# Patient Record
Sex: FEMALE | Race: WHITE | NOT HISPANIC OR LATINO | Employment: UNEMPLOYED | URBAN - METROPOLITAN AREA
[De-identification: names, ages, dates, MRNs, and addresses within clinical notes are randomized per-mention and may not be internally consistent; named-entity substitution may affect disease eponyms.]

---

## 2017-11-08 ENCOUNTER — TELEPHONE (OUTPATIENT)
Dept: GENETICS | Facility: HOSPITAL | Age: 22
End: 2017-11-08

## 2017-11-08 NOTE — TELEPHONE ENCOUNTER
Called to schedule Genetic appt. Patient wants to talk to spouse first and will call back to schedule.

## 2018-10-22 LAB
EXTERNAL ABO GROUPING: NORMAL
EXTERNAL ANTIBODY SCREEN: NEGATIVE
EXTERNAL CHLAMYDIA SCREEN: NEGATIVE
EXTERNAL GONORRHEA SCREEN: NEGATIVE
EXTERNAL HEPATITIS B SURFACE ANTIGEN: NEGATIVE
EXTERNAL HEPATITIS C AB: NEGATIVE
EXTERNAL RH FACTOR: POSITIVE
EXTERNAL RUBELLA QUALITATIVE: NORMAL
EXTERNAL SYPHILIS RPR SCREEN: NORMAL
HIV1 P24 AG SERPL QL IA: NORMAL

## 2019-03-18 LAB — EXTERNAL GTT 1 HOUR: 85

## 2019-05-13 ENCOUNTER — APPOINTMENT (OUTPATIENT)
Dept: LAB | Facility: HOSPITAL | Age: 24
End: 2019-05-13

## 2019-05-13 DIAGNOSIS — Z34.03 ENCOUNTER FOR SUPERVISION OF NORMAL FIRST PREGNANCY IN THIRD TRIMESTER: Primary | ICD-10-CM

## 2019-05-13 PROCEDURE — 87081 CULTURE SCREEN ONLY: CPT

## 2019-05-16 LAB — BACTERIA SPEC AEROBE CULT: NORMAL

## 2019-05-28 ENCOUNTER — ANESTHESIA EVENT (OUTPATIENT)
Dept: LABOR AND DELIVERY | Facility: HOSPITAL | Age: 24
End: 2019-05-28

## 2019-05-28 ENCOUNTER — HOSPITAL ENCOUNTER (INPATIENT)
Facility: HOSPITAL | Age: 24
LOS: 2 days | Discharge: HOME | End: 2019-05-30
Attending: OBSTETRICS & GYNECOLOGY | Admitting: OBSTETRICS & GYNECOLOGY

## 2019-05-28 ENCOUNTER — ANESTHESIA (OUTPATIENT)
Dept: LABOR AND DELIVERY | Facility: HOSPITAL | Age: 24
End: 2019-05-28

## 2019-05-28 DIAGNOSIS — R33.8 ACUTE URINARY RETENTION: Primary | ICD-10-CM

## 2019-05-28 PROBLEM — Z34.90 PREGNANCY: Status: ACTIVE | Noted: 2019-05-28

## 2019-05-28 PROBLEM — Z34.90 PREGNANCY: Status: RESOLVED | Noted: 2019-05-28 | Resolved: 2019-05-28

## 2019-05-28 LAB
ABO GROUP BLD: NORMAL
ALP SERPL-CCNC: 172 U/L (ref 39–117)
ALT SERPL W P-5'-P-CCNC: 8 U/L (ref 1–33)
AMPHET+METHAMPHET UR QL: NEGATIVE
AMPHETAMINES UR QL: NEGATIVE
AST SERPL-CCNC: 21 U/L (ref 1–32)
BARBITURATES UR QL SCN: NEGATIVE
BENZODIAZ UR QL SCN: NEGATIVE
BILIRUB SERPL-MCNC: 0.4 MG/DL (ref 0.2–1.2)
BLD GP AB SCN SERPL QL: NEGATIVE
BUPRENORPHINE SERPL-MCNC: NEGATIVE NG/ML
CANNABINOIDS SERPL QL: NEGATIVE
COCAINE UR QL: NEGATIVE
CREAT BLD-MCNC: 1.04 MG/DL (ref 0.57–1)
DEPRECATED RDW RBC AUTO: 39.6 FL (ref 37–54)
ERYTHROCYTE [DISTWIDTH] IN BLOOD BY AUTOMATED COUNT: 12.8 % (ref 12.3–15.4)
HCT VFR BLD AUTO: 36.5 % (ref 34–46.6)
HGB BLD-MCNC: 12 G/DL (ref 12–15.9)
LDH SERPL-CCNC: 229 U/L (ref 135–214)
MCH RBC QN AUTO: 28.4 PG (ref 26.6–33)
MCHC RBC AUTO-ENTMCNC: 32.9 G/DL (ref 31.5–35.7)
MCV RBC AUTO: 86.3 FL (ref 79–97)
METHADONE UR QL SCN: NEGATIVE
OPIATES UR QL: NEGATIVE
OXYCODONE UR QL SCN: NEGATIVE
PCP UR QL SCN: NEGATIVE
PLATELET # BLD AUTO: 192 10*3/MM3 (ref 140–450)
PMV BLD AUTO: 13.4 FL (ref 6–12)
PROPOXYPH UR QL: NEGATIVE
RBC # BLD AUTO: 4.23 10*6/MM3 (ref 3.77–5.28)
RH BLD: POSITIVE
T&S EXPIRATION DATE: NORMAL
TRICYCLICS UR QL SCN: NEGATIVE
URATE SERPL-MCNC: 5.3 MG/DL (ref 2.4–5.7)
WBC NRBC COR # BLD: 10.33 10*3/MM3 (ref 3.4–10.8)

## 2019-05-28 PROCEDURE — 10907ZC DRAINAGE OF AMNIOTIC FLUID, THERAPEUTIC FROM PRODUCTS OF CONCEPTION, VIA NATURAL OR ARTIFICIAL OPENING: ICD-10-PCS | Performed by: OBSTETRICS & GYNECOLOGY

## 2019-05-28 PROCEDURE — 25010000002 FENTANYL CITRATE (PF) 100 MCG/2ML SOLUTION: Performed by: NURSE ANESTHETIST, CERTIFIED REGISTERED

## 2019-05-28 PROCEDURE — 0UQMXZZ REPAIR VULVA, EXTERNAL APPROACH: ICD-10-PCS | Performed by: OBSTETRICS & GYNECOLOGY

## 2019-05-28 PROCEDURE — C1755 CATHETER, INTRASPINAL: HCPCS

## 2019-05-28 PROCEDURE — 86900 BLOOD TYPING SEROLOGIC ABO: CPT

## 2019-05-28 PROCEDURE — 82247 BILIRUBIN TOTAL: CPT | Performed by: OBSTETRICS & GYNECOLOGY

## 2019-05-28 PROCEDURE — 82565 ASSAY OF CREATININE: CPT | Performed by: OBSTETRICS & GYNECOLOGY

## 2019-05-28 PROCEDURE — 86850 RBC ANTIBODY SCREEN: CPT | Performed by: OBSTETRICS & GYNECOLOGY

## 2019-05-28 PROCEDURE — 25010000002 MAGNESIUM SULFATE-LACT RINGERS 40 GM/580ML SOLUTION: Performed by: OBSTETRICS & GYNECOLOGY

## 2019-05-28 PROCEDURE — 84460 ALANINE AMINO (ALT) (SGPT): CPT | Performed by: OBSTETRICS & GYNECOLOGY

## 2019-05-28 PROCEDURE — 85027 COMPLETE CBC AUTOMATED: CPT | Performed by: OBSTETRICS & GYNECOLOGY

## 2019-05-28 PROCEDURE — 84075 ASSAY ALKALINE PHOSPHATASE: CPT | Performed by: OBSTETRICS & GYNECOLOGY

## 2019-05-28 PROCEDURE — 3E033VJ INTRODUCTION OF OTHER HORMONE INTO PERIPHERAL VEIN, PERCUTANEOUS APPROACH: ICD-10-PCS | Performed by: OBSTETRICS & GYNECOLOGY

## 2019-05-28 PROCEDURE — 25010000002 ROPIVACAINE PER 1 MG: Performed by: NURSE ANESTHETIST, CERTIFIED REGISTERED

## 2019-05-28 PROCEDURE — 84450 TRANSFERASE (AST) (SGOT): CPT | Performed by: OBSTETRICS & GYNECOLOGY

## 2019-05-28 PROCEDURE — 86900 BLOOD TYPING SEROLOGIC ABO: CPT | Performed by: OBSTETRICS & GYNECOLOGY

## 2019-05-28 PROCEDURE — 0UQGXZZ REPAIR VAGINA, EXTERNAL APPROACH: ICD-10-PCS | Performed by: OBSTETRICS & GYNECOLOGY

## 2019-05-28 PROCEDURE — 0DQR0ZZ REPAIR ANAL SPHINCTER, OPEN APPROACH: ICD-10-PCS | Performed by: OBSTETRICS & GYNECOLOGY

## 2019-05-28 PROCEDURE — 84550 ASSAY OF BLOOD/URIC ACID: CPT | Performed by: OBSTETRICS & GYNECOLOGY

## 2019-05-28 PROCEDURE — C1755 CATHETER, INTRASPINAL: HCPCS | Performed by: ANESTHESIOLOGY

## 2019-05-28 PROCEDURE — 59025 FETAL NON-STRESS TEST: CPT

## 2019-05-28 PROCEDURE — 83615 LACTATE (LD) (LDH) ENZYME: CPT | Performed by: OBSTETRICS & GYNECOLOGY

## 2019-05-28 PROCEDURE — 86901 BLOOD TYPING SEROLOGIC RH(D): CPT | Performed by: OBSTETRICS & GYNECOLOGY

## 2019-05-28 PROCEDURE — 80306 DRUG TEST PRSMV INSTRMNT: CPT | Performed by: OBSTETRICS & GYNECOLOGY

## 2019-05-28 PROCEDURE — 86901 BLOOD TYPING SEROLOGIC RH(D): CPT

## 2019-05-28 RX ORDER — DOCUSATE SODIUM 100 MG/1
100 CAPSULE, LIQUID FILLED ORAL 2 TIMES DAILY
Status: DISCONTINUED | OUTPATIENT
Start: 2019-05-28 | End: 2019-05-30 | Stop reason: HOSPADM

## 2019-05-28 RX ORDER — DIPHENHYDRAMINE HYDROCHLORIDE 50 MG/ML
12.5 INJECTION INTRAMUSCULAR; INTRAVENOUS EVERY 8 HOURS PRN
Status: DISCONTINUED | OUTPATIENT
Start: 2019-05-28 | End: 2019-05-30

## 2019-05-28 RX ORDER — LIDOCAINE HYDROCHLORIDE AND EPINEPHRINE 15; 5 MG/ML; UG/ML
INJECTION, SOLUTION EPIDURAL AS NEEDED
Status: DISCONTINUED | OUTPATIENT
Start: 2019-05-28 | End: 2019-05-28 | Stop reason: SURG

## 2019-05-28 RX ORDER — BISACODYL 10 MG
10 SUPPOSITORY, RECTAL RECTAL DAILY PRN
Status: DISCONTINUED | OUTPATIENT
Start: 2019-05-29 | End: 2019-05-30 | Stop reason: HOSPADM

## 2019-05-28 RX ORDER — MISOPROSTOL 200 UG/1
800 TABLET ORAL AS NEEDED
Status: DISCONTINUED | OUTPATIENT
Start: 2019-05-28 | End: 2019-05-30

## 2019-05-28 RX ORDER — ONDANSETRON 4 MG/1
4 TABLET, FILM COATED ORAL EVERY 6 HOURS PRN
Status: DISCONTINUED | OUTPATIENT
Start: 2019-05-28 | End: 2019-05-30 | Stop reason: HOSPADM

## 2019-05-28 RX ORDER — PROMETHAZINE HYDROCHLORIDE 12.5 MG/1
12.5 TABLET ORAL EVERY 4 HOURS PRN
Status: DISCONTINUED | OUTPATIENT
Start: 2019-05-28 | End: 2019-05-30 | Stop reason: HOSPADM

## 2019-05-28 RX ORDER — TRISODIUM CITRATE DIHYDRATE AND CITRIC ACID MONOHYDRATE 500; 334 MG/5ML; MG/5ML
30 SOLUTION ORAL ONCE
Status: DISCONTINUED | OUTPATIENT
Start: 2019-05-28 | End: 2019-05-30

## 2019-05-28 RX ORDER — PANTOPRAZOLE SODIUM 20 MG/1
20 TABLET, DELAYED RELEASE ORAL DAILY
COMMUNITY
End: 2019-06-11

## 2019-05-28 RX ORDER — OXYTOCIN-SODIUM CHLORIDE 0.9% IV SOLN 30 UNIT/500ML 30-0.9/5 UT/ML-%
85 SOLUTION INTRAVENOUS ONCE
Status: COMPLETED | OUTPATIENT
Start: 2019-05-28 | End: 2019-05-28

## 2019-05-28 RX ORDER — ONDANSETRON 2 MG/ML
4 INJECTION INTRAMUSCULAR; INTRAVENOUS EVERY 6 HOURS PRN
Status: DISCONTINUED | OUTPATIENT
Start: 2019-05-28 | End: 2019-05-30 | Stop reason: HOSPADM

## 2019-05-28 RX ORDER — PRENATAL WITH FERROUS FUM AND FOLIC ACID 3080; 920; 120; 400; 22; 1.84; 3; 20; 10; 1; 12; 200; 27; 25; 2 [IU]/1; [IU]/1; MG/1; [IU]/1; MG/1; MG/1; MG/1; MG/1; MG/1; MG/1; UG/1; MG/1; MG/1; MG/1; MG/1
1 TABLET ORAL DAILY
COMMUNITY

## 2019-05-28 RX ORDER — OXYTOCIN-SODIUM CHLORIDE 0.9% IV SOLN 30 UNIT/500ML 30-0.9/5 UT/ML-%
2-24 SOLUTION INTRAVENOUS
Status: DISCONTINUED | OUTPATIENT
Start: 2019-05-28 | End: 2019-05-28

## 2019-05-28 RX ORDER — CARBOPROST TROMETHAMINE 250 UG/ML
250 INJECTION, SOLUTION INTRAMUSCULAR AS NEEDED
Status: DISCONTINUED | OUTPATIENT
Start: 2019-05-28 | End: 2019-05-30

## 2019-05-28 RX ORDER — EPHEDRINE SULFATE/0.9% NACL/PF 25 MG/5 ML
10 SYRINGE (ML) INTRAVENOUS
Status: DISCONTINUED | OUTPATIENT
Start: 2019-05-28 | End: 2019-05-30

## 2019-05-28 RX ORDER — IBUPROFEN 600 MG/1
600 TABLET ORAL EVERY 6 HOURS PRN
Status: DISCONTINUED | OUTPATIENT
Start: 2019-05-28 | End: 2019-05-30 | Stop reason: HOSPADM

## 2019-05-28 RX ORDER — LANOLIN 100 %
OINTMENT (GRAM) TOPICAL
Status: DISCONTINUED | OUTPATIENT
Start: 2019-05-28 | End: 2019-05-30 | Stop reason: HOSPADM

## 2019-05-28 RX ORDER — PRENATAL VIT/IRON FUM/FOLIC AC 27MG-0.8MG
1 TABLET ORAL DAILY
Status: DISCONTINUED | OUTPATIENT
Start: 2019-05-28 | End: 2019-05-30 | Stop reason: HOSPADM

## 2019-05-28 RX ORDER — ONDANSETRON 2 MG/ML
4 INJECTION INTRAMUSCULAR; INTRAVENOUS ONCE AS NEEDED
Status: DISCONTINUED | OUTPATIENT
Start: 2019-05-28 | End: 2019-05-30

## 2019-05-28 RX ORDER — MAGNESIUM CARB/ALUMINUM HYDROX 105-160MG
30 TABLET,CHEWABLE ORAL ONCE
Status: DISCONTINUED | OUTPATIENT
Start: 2019-05-28 | End: 2019-05-30

## 2019-05-28 RX ORDER — SODIUM CHLORIDE, SODIUM LACTATE, POTASSIUM CHLORIDE, CALCIUM CHLORIDE 600; 310; 30; 20 MG/100ML; MG/100ML; MG/100ML; MG/100ML
125 INJECTION, SOLUTION INTRAVENOUS CONTINUOUS
Status: DISCONTINUED | OUTPATIENT
Start: 2019-05-28 | End: 2019-05-30

## 2019-05-28 RX ORDER — MAGNESIUM SULF/RINGERS LACTATE 40 G/500ML
2 PLASTIC BAG, INJECTION (ML) INTRAVENOUS CONTINUOUS
Status: DISPENSED | OUTPATIENT
Start: 2019-05-28 | End: 2019-05-29

## 2019-05-28 RX ORDER — METOCLOPRAMIDE HYDROCHLORIDE 5 MG/ML
10 INJECTION INTRAMUSCULAR; INTRAVENOUS ONCE AS NEEDED
Status: DISCONTINUED | OUTPATIENT
Start: 2019-05-28 | End: 2019-05-30

## 2019-05-28 RX ORDER — HYDROCODONE BITARTRATE AND ACETAMINOPHEN 5; 325 MG/1; MG/1
1 TABLET ORAL EVERY 4 HOURS PRN
Status: DISCONTINUED | OUTPATIENT
Start: 2019-05-28 | End: 2019-05-30 | Stop reason: HOSPADM

## 2019-05-28 RX ORDER — OXYCODONE HYDROCHLORIDE AND ACETAMINOPHEN 5; 325 MG/1; MG/1
1 TABLET ORAL EVERY 4 HOURS PRN
Status: DISCONTINUED | OUTPATIENT
Start: 2019-05-28 | End: 2019-05-30 | Stop reason: HOSPADM

## 2019-05-28 RX ORDER — BUTORPHANOL TARTRATE 1 MG/ML
1 INJECTION, SOLUTION INTRAMUSCULAR; INTRAVENOUS
Status: DISCONTINUED | OUTPATIENT
Start: 2019-05-28 | End: 2019-05-30

## 2019-05-28 RX ORDER — FAMOTIDINE 10 MG/ML
20 INJECTION, SOLUTION INTRAVENOUS ONCE AS NEEDED
Status: DISCONTINUED | OUTPATIENT
Start: 2019-05-28 | End: 2019-05-30

## 2019-05-28 RX ORDER — FENTANYL CITRATE 50 UG/ML
INJECTION, SOLUTION INTRAMUSCULAR; INTRAVENOUS AS NEEDED
Status: DISCONTINUED | OUTPATIENT
Start: 2019-05-28 | End: 2019-05-28 | Stop reason: SURG

## 2019-05-28 RX ORDER — SODIUM CHLORIDE 0.9 % (FLUSH) 0.9 %
1-10 SYRINGE (ML) INJECTION AS NEEDED
Status: DISCONTINUED | OUTPATIENT
Start: 2019-05-28 | End: 2019-05-30 | Stop reason: HOSPADM

## 2019-05-28 RX ORDER — ROPIVACAINE HYDROCHLORIDE 2 MG/ML
12 INJECTION, SOLUTION EPIDURAL; INFILTRATION; PERINEURAL CONTINUOUS
Status: DISCONTINUED | OUTPATIENT
Start: 2019-05-28 | End: 2019-05-30

## 2019-05-28 RX ORDER — PROMETHAZINE HYDROCHLORIDE 25 MG/ML
12.5 INJECTION, SOLUTION INTRAMUSCULAR; INTRAVENOUS EVERY 4 HOURS PRN
Status: DISCONTINUED | OUTPATIENT
Start: 2019-05-28 | End: 2019-05-30 | Stop reason: HOSPADM

## 2019-05-28 RX ORDER — OXYTOCIN-SODIUM CHLORIDE 0.9% IV SOLN 30 UNIT/500ML 30-0.9/5 UT/ML-%
650 SOLUTION INTRAVENOUS ONCE
Status: COMPLETED | OUTPATIENT
Start: 2019-05-28 | End: 2019-05-28

## 2019-05-28 RX ORDER — METHYLERGONOVINE MALEATE 0.2 MG/ML
200 INJECTION INTRAVENOUS ONCE AS NEEDED
Status: DISCONTINUED | OUTPATIENT
Start: 2019-05-28 | End: 2019-05-30

## 2019-05-28 RX ADMIN — DOCUSATE SODIUM 100 MG: 100 CAPSULE, LIQUID FILLED ORAL at 20:35

## 2019-05-28 RX ADMIN — OXYCODONE HYDROCHLORIDE AND ACETAMINOPHEN 1 TABLET: 5; 325 TABLET ORAL at 19:05

## 2019-05-28 RX ADMIN — ROPIVACAINE HYDROCHLORIDE 10 ML: 5 INJECTION, SOLUTION EPIDURAL; INFILTRATION; PERINEURAL at 14:09

## 2019-05-28 RX ADMIN — Medication 2 G/HR: at 11:36

## 2019-05-28 RX ADMIN — PRENATAL VIT W/ FE FUMARATE-FA TAB 27-0.8 MG 1 TABLET: 27-0.8 TAB at 20:35

## 2019-05-28 RX ADMIN — SODIUM CHLORIDE, POTASSIUM CHLORIDE, SODIUM LACTATE AND CALCIUM CHLORIDE 125 ML/HR: 600; 310; 30; 20 INJECTION, SOLUTION INTRAVENOUS at 21:14

## 2019-05-28 RX ADMIN — SODIUM CHLORIDE, POTASSIUM CHLORIDE, SODIUM LACTATE AND CALCIUM CHLORIDE 125 ML/HR: 600; 310; 30; 20 INJECTION, SOLUTION INTRAVENOUS at 10:23

## 2019-05-28 RX ADMIN — FENTANYL CITRATE 100 MCG: 50 INJECTION, SOLUTION INTRAMUSCULAR; INTRAVENOUS at 14:08

## 2019-05-28 RX ADMIN — OXYTOCIN 2 MILLI-UNITS/MIN: 10 INJECTION, SOLUTION INTRAMUSCULAR; INTRAVENOUS at 10:30

## 2019-05-28 RX ADMIN — ROPIVACAINE HYDROCHLORIDE 12 ML/HR: 2 INJECTION, SOLUTION EPIDURAL; INFILTRATION at 14:12

## 2019-05-28 RX ADMIN — OXYTOCIN 650 ML/HR: 10 INJECTION, SOLUTION INTRAMUSCULAR; INTRAVENOUS at 15:02

## 2019-05-28 RX ADMIN — OXYTOCIN 85 ML/HR: 10 INJECTION, SOLUTION INTRAMUSCULAR; INTRAVENOUS at 15:20

## 2019-05-28 RX ADMIN — LIDOCAINE HYDROCHLORIDE AND EPINEPHRINE 3 ML: 15; 5 INJECTION, SOLUTION EPIDURAL at 14:05

## 2019-05-28 RX ADMIN — Medication: at 19:06

## 2019-05-28 RX ADMIN — SODIUM CHLORIDE, POTASSIUM CHLORIDE, SODIUM LACTATE AND CALCIUM CHLORIDE 1000 ML: 600; 310; 30; 20 INJECTION, SOLUTION INTRAVENOUS at 13:48

## 2019-05-28 NOTE — ANESTHESIA PREPROCEDURE EVALUATION
Anesthesia Evaluation     Patient summary reviewed and Nursing notes reviewed   NPO Solid Status: > 6 hours  NPO Liquid Status: > 6 hours           Airway   Mallampati: II  TM distance: >3 FB  Neck ROM: full  No difficulty expected  Dental      Pulmonary - negative pulmonary ROS   Cardiovascular - negative cardio ROS        Neuro/Psych- negative ROS  GI/Hepatic/Renal/Endo    (+)  GERD,      Musculoskeletal (-) negative ROS    Abdominal    Substance History - negative use     OB/GYN    (+) Pregnant,         Other                        Anesthesia Plan    ASA 2     epidural     Anesthetic plan, all risks, benefits, and alternatives have been provided, discussed and informed consent has been obtained with: patient.

## 2019-05-28 NOTE — ANESTHESIA PROCEDURE NOTES
Labor Epidural      Patient location during procedure: OB  Performed By  Anesthesiologist: Jarrett Cortez MD  CRNA: Tasia Fritz CRNA  Preanesthetic Checklist  Completed: patient identified, surgical consent, pre-op evaluation, timeout performed, IV checked, risks and benefits discussed and monitors and equipment checked  Prep:  Pt Position:sitting  Sterile Tech:cap, gloves, mask and sterile barrier  Prep:DuraPrep  Monitoring:blood pressure monitoring  Epidural Block Procedure:  Approach:midline  Guidance:palpation technique  Location:L3-L4  Needle Type:Tuohy  Needle Gauge:17 G  Loss of Resistance Medium: saline  Loss of Resistance: 5cm  Cath Depth at skin:11 cm  Paresthesia: none  Aspiration:negative  Test Dose:negative  Number of Attempts: 1  Post Assessment:  Dressing:occlusive dressing applied and secured with tape  Pt Tolerance:patient tolerated the procedure well with no apparent complications  Complications:no

## 2019-05-29 LAB
ALP SERPL-CCNC: 128 U/L (ref 39–117)
ALT SERPL W P-5'-P-CCNC: 10 U/L (ref 1–33)
AST SERPL-CCNC: 24 U/L (ref 1–32)
BASOPHILS # BLD AUTO: 0.01 10*3/MM3 (ref 0–0.2)
BASOPHILS NFR BLD AUTO: 0.1 % (ref 0–1.5)
BILIRUB SERPL-MCNC: 0.2 MG/DL (ref 0.2–1.2)
CREAT BLD-MCNC: 0.71 MG/DL (ref 0.57–1)
DEPRECATED RDW RBC AUTO: 39.2 FL (ref 37–54)
EOSINOPHIL # BLD AUTO: 0.01 10*3/MM3 (ref 0–0.4)
EOSINOPHIL NFR BLD AUTO: 0.1 % (ref 0.3–6.2)
ERYTHROCYTE [DISTWIDTH] IN BLOOD BY AUTOMATED COUNT: 12.8 % (ref 12.3–15.4)
HCT VFR BLD AUTO: 25.5 % (ref 34–46.6)
HGB BLD-MCNC: 8.6 G/DL (ref 12–15.9)
IMM GRANULOCYTES # BLD AUTO: 0.06 10*3/MM3 (ref 0–0.05)
IMM GRANULOCYTES NFR BLD AUTO: 0.5 % (ref 0–0.5)
LDH SERPL-CCNC: 341 U/L (ref 135–214)
LYMPHOCYTES # BLD AUTO: 1.53 10*3/MM3 (ref 0.7–3.1)
LYMPHOCYTES NFR BLD AUTO: 11.7 % (ref 19.6–45.3)
MCH RBC QN AUTO: 28.4 PG (ref 26.6–33)
MCHC RBC AUTO-ENTMCNC: 33.7 G/DL (ref 31.5–35.7)
MCV RBC AUTO: 84.2 FL (ref 79–97)
MONOCYTES # BLD AUTO: 0.63 10*3/MM3 (ref 0.1–0.9)
MONOCYTES NFR BLD AUTO: 4.8 % (ref 5–12)
NEUTROPHILS # BLD AUTO: 10.86 10*3/MM3 (ref 1.7–7)
NEUTROPHILS NFR BLD AUTO: 82.8 % (ref 42.7–76)
PLATELET # BLD AUTO: 156 10*3/MM3 (ref 140–450)
PMV BLD AUTO: 11.7 FL (ref 6–12)
RBC # BLD AUTO: 3.03 10*6/MM3 (ref 3.77–5.28)
URATE SERPL-MCNC: 4.5 MG/DL (ref 2.4–5.7)
WBC NRBC COR # BLD: 13.1 10*3/MM3 (ref 3.4–10.8)

## 2019-05-29 PROCEDURE — 84550 ASSAY OF BLOOD/URIC ACID: CPT | Performed by: OBSTETRICS & GYNECOLOGY

## 2019-05-29 PROCEDURE — 84460 ALANINE AMINO (ALT) (SGPT): CPT | Performed by: OBSTETRICS & GYNECOLOGY

## 2019-05-29 PROCEDURE — 84075 ASSAY ALKALINE PHOSPHATASE: CPT | Performed by: OBSTETRICS & GYNECOLOGY

## 2019-05-29 PROCEDURE — 83615 LACTATE (LD) (LDH) ENZYME: CPT | Performed by: OBSTETRICS & GYNECOLOGY

## 2019-05-29 PROCEDURE — 25010000002 MAGNESIUM SULFATE-LACT RINGERS 40 GM/580ML SOLUTION: Performed by: OBSTETRICS & GYNECOLOGY

## 2019-05-29 PROCEDURE — 82247 BILIRUBIN TOTAL: CPT | Performed by: OBSTETRICS & GYNECOLOGY

## 2019-05-29 PROCEDURE — 85025 COMPLETE CBC W/AUTO DIFF WBC: CPT | Performed by: OBSTETRICS & GYNECOLOGY

## 2019-05-29 PROCEDURE — 84450 TRANSFERASE (AST) (SGOT): CPT | Performed by: OBSTETRICS & GYNECOLOGY

## 2019-05-29 PROCEDURE — 82565 ASSAY OF CREATININE: CPT | Performed by: OBSTETRICS & GYNECOLOGY

## 2019-05-29 RX ORDER — NIFEDIPINE 30 MG/1
30 TABLET, EXTENDED RELEASE ORAL 2 TIMES DAILY
Status: DISCONTINUED | OUTPATIENT
Start: 2019-05-29 | End: 2019-05-30 | Stop reason: HOSPADM

## 2019-05-29 RX ADMIN — IBUPROFEN 600 MG: 600 TABLET ORAL at 05:45

## 2019-05-29 RX ADMIN — IBUPROFEN 600 MG: 600 TABLET ORAL at 23:51

## 2019-05-29 RX ADMIN — NIFEDIPINE 30 MG: 30 TABLET, FILM COATED, EXTENDED RELEASE ORAL at 15:39

## 2019-05-29 RX ADMIN — DOCUSATE SODIUM 100 MG: 100 CAPSULE, LIQUID FILLED ORAL at 21:30

## 2019-05-29 RX ADMIN — Medication 2 G/HR: at 02:36

## 2019-05-29 RX ADMIN — IBUPROFEN 600 MG: 600 TABLET ORAL at 14:53

## 2019-05-29 RX ADMIN — NIFEDIPINE 30 MG: 30 TABLET, FILM COATED, EXTENDED RELEASE ORAL at 21:30

## 2019-05-29 NOTE — ANESTHESIA POSTPROCEDURE EVALUATION
Patient: Nancy Little    Procedure Summary     Date:  05/28/19 Room / Location:      Anesthesia Start:  1359 Anesthesia Stop:  1502    Procedure:  LABOR ANALGESIA Diagnosis:      Scheduled Providers:   Provider:  Jarrett Cortez MD    Anesthesia Type:  epidural ASA Status:  2          Anesthesia Type: epidural  Last vitals  BP   138/99 (05/29/19 0627)   Temp   98.3 °F (36.8 °C) (05/29/19 0748)   Pulse   112 (05/29/19 0627)   Resp   16 (05/29/19 0748)     SpO2         Post Anesthesia Care and Evaluation    Patient location during evaluation: bedside  Patient participation: complete - patient participated  Level of consciousness: awake and awake and alert  Pain score: 0  Pain management: satisfactory to patient  Airway patency: patent  Anesthetic complications: No anesthetic complications  PONV Status: none  Cardiovascular status: acceptable  Respiratory status: acceptable  Hydration status: acceptable  Post Neuraxial Block status: Motor and sensory function returned to baseline and No signs or symptoms of PDPH

## 2019-05-30 VITALS
SYSTOLIC BLOOD PRESSURE: 120 MMHG | TEMPERATURE: 98.3 F | DIASTOLIC BLOOD PRESSURE: 79 MMHG | RESPIRATION RATE: 14 BRPM | BODY MASS INDEX: 24.73 KG/M2 | HEART RATE: 117 BPM | HEIGHT: 61 IN | WEIGHT: 131 LBS

## 2019-05-30 PROBLEM — R33.8 ACUTE URINARY RETENTION: Status: ACTIVE | Noted: 2019-05-30

## 2019-05-30 PROBLEM — O14.90 PRE-ECLAMPSIA: Status: ACTIVE | Noted: 2019-05-30

## 2019-05-30 LAB
ALBUMIN SERPL-MCNC: 2.9 G/DL (ref 3.5–5.2)
ALBUMIN/GLOB SERPL: 1.1 G/DL
ALP SERPL-CCNC: 126 U/L (ref 39–117)
ALP SERPL-CCNC: 126 U/L (ref 39–117)
ALT SERPL W P-5'-P-CCNC: 11 U/L (ref 1–33)
ALT SERPL W P-5'-P-CCNC: 11 U/L (ref 1–33)
ANION GAP SERPL CALCULATED.3IONS-SCNC: 7 MMOL/L
AST SERPL-CCNC: 22 U/L (ref 1–32)
AST SERPL-CCNC: 22 U/L (ref 1–32)
BILIRUB SERPL-MCNC: 0.3 MG/DL (ref 0.2–1.2)
BILIRUB SERPL-MCNC: 0.3 MG/DL (ref 0.2–1.2)
BUN BLD-MCNC: 9 MG/DL (ref 6–20)
BUN/CREAT SERPL: 12.3 (ref 7–25)
CALCIUM SPEC-SCNC: 6.9 MG/DL (ref 8.6–10.5)
CHLORIDE SERPL-SCNC: 109 MMOL/L (ref 98–107)
CO2 SERPL-SCNC: 25 MMOL/L (ref 22–29)
CREAT BLD-MCNC: 0.73 MG/DL (ref 0.57–1)
CREAT BLD-MCNC: 0.73 MG/DL (ref 0.57–1)
DEPRECATED RDW RBC AUTO: 42.8 FL (ref 37–54)
ERYTHROCYTE [DISTWIDTH] IN BLOOD BY AUTOMATED COUNT: 13.5 % (ref 12.3–15.4)
GFR SERPL CREATININE-BSD FRML MDRD: 97 ML/MIN/1.73
GLOBULIN UR ELPH-MCNC: 2.7 GM/DL
GLUCOSE BLD-MCNC: 77 MG/DL (ref 65–99)
HCT VFR BLD AUTO: 26.4 % (ref 34–46.6)
HGB BLD-MCNC: 8.5 G/DL (ref 12–15.9)
LDH SERPL-CCNC: 405 U/L (ref 135–214)
MCH RBC QN AUTO: 28.1 PG (ref 26.6–33)
MCHC RBC AUTO-ENTMCNC: 32.2 G/DL (ref 31.5–35.7)
MCV RBC AUTO: 87.4 FL (ref 79–97)
PLATELET # BLD AUTO: 180 10*3/MM3 (ref 140–450)
PMV BLD AUTO: 11.8 FL (ref 6–12)
POTASSIUM BLD-SCNC: 4.1 MMOL/L (ref 3.5–5.2)
PROT SERPL-MCNC: 5.6 G/DL (ref 6–8.5)
RBC # BLD AUTO: 3.02 10*6/MM3 (ref 3.77–5.28)
SODIUM BLD-SCNC: 141 MMOL/L (ref 136–145)
URATE SERPL-MCNC: 4.5 MG/DL (ref 2.4–5.7)
WBC NRBC COR # BLD: 12.93 10*3/MM3 (ref 3.4–10.8)

## 2019-05-30 PROCEDURE — 82565 ASSAY OF CREATININE: CPT | Performed by: OBSTETRICS & GYNECOLOGY

## 2019-05-30 PROCEDURE — 84450 TRANSFERASE (AST) (SGOT): CPT | Performed by: OBSTETRICS & GYNECOLOGY

## 2019-05-30 PROCEDURE — 84550 ASSAY OF BLOOD/URIC ACID: CPT | Performed by: OBSTETRICS & GYNECOLOGY

## 2019-05-30 PROCEDURE — 83615 LACTATE (LD) (LDH) ENZYME: CPT | Performed by: OBSTETRICS & GYNECOLOGY

## 2019-05-30 PROCEDURE — 80053 COMPREHEN METABOLIC PANEL: CPT | Performed by: OBSTETRICS & GYNECOLOGY

## 2019-05-30 PROCEDURE — 84460 ALANINE AMINO (ALT) (SGPT): CPT | Performed by: OBSTETRICS & GYNECOLOGY

## 2019-05-30 PROCEDURE — 82247 BILIRUBIN TOTAL: CPT | Performed by: OBSTETRICS & GYNECOLOGY

## 2019-05-30 PROCEDURE — 85027 COMPLETE CBC AUTOMATED: CPT | Performed by: OBSTETRICS & GYNECOLOGY

## 2019-05-30 PROCEDURE — 84075 ASSAY ALKALINE PHOSPHATASE: CPT | Performed by: OBSTETRICS & GYNECOLOGY

## 2019-05-30 RX ORDER — DOCUSATE SODIUM 100 MG/1
100 CAPSULE, LIQUID FILLED ORAL 2 TIMES DAILY
Status: DISCONTINUED | OUTPATIENT
Start: 2019-05-30 | End: 2019-05-30 | Stop reason: HOSPADM

## 2019-05-30 RX ORDER — NITROFURANTOIN 25; 75 MG/1; MG/1
100 CAPSULE ORAL NIGHTLY
Status: DISCONTINUED | OUTPATIENT
Start: 2019-05-30 | End: 2019-05-30 | Stop reason: HOSPADM

## 2019-05-30 RX ORDER — DIPHENHYDRAMINE HCL 25 MG
25 CAPSULE ORAL NIGHTLY PRN
Status: DISCONTINUED | OUTPATIENT
Start: 2019-05-30 | End: 2019-05-30 | Stop reason: HOSPADM

## 2019-05-30 RX ORDER — NITROFURANTOIN 25; 75 MG/1; MG/1
100 CAPSULE ORAL NIGHTLY
Qty: 7 CAPSULE | Refills: 0 | Status: SHIPPED | OUTPATIENT
Start: 2019-05-30 | End: 2019-05-30

## 2019-05-30 RX ORDER — NIFEDIPINE 30 MG/1
30 TABLET, FILM COATED, EXTENDED RELEASE ORAL 2 TIMES DAILY
Qty: 30 TABLET | Refills: 1 | Status: SHIPPED | OUTPATIENT
Start: 2019-05-30 | End: 2019-05-30

## 2019-05-30 RX ORDER — SODIUM CHLORIDE 0.9 % (FLUSH) 0.9 %
1-10 SYRINGE (ML) INJECTION AS NEEDED
Status: DISCONTINUED | OUTPATIENT
Start: 2019-05-30 | End: 2019-05-30 | Stop reason: HOSPADM

## 2019-05-30 RX ORDER — NIFEDIPINE 30 MG/1
30 TABLET, FILM COATED, EXTENDED RELEASE ORAL 2 TIMES DAILY
Qty: 30 TABLET | Refills: 1 | Status: SHIPPED | OUTPATIENT
Start: 2019-05-30 | End: 2019-06-11

## 2019-05-30 RX ORDER — NITROFURANTOIN 25; 75 MG/1; MG/1
100 CAPSULE ORAL 2 TIMES DAILY
Qty: 14 CAPSULE | Refills: 0 | Status: SHIPPED | OUTPATIENT
Start: 2019-05-30 | End: 2019-06-06

## 2019-05-30 RX ORDER — ACETAMINOPHEN 500 MG
1000 TABLET ORAL EVERY 4 HOURS PRN
Status: DISCONTINUED | OUTPATIENT
Start: 2019-05-30 | End: 2019-05-30 | Stop reason: HOSPADM

## 2019-05-30 RX ORDER — BISACODYL 10 MG
10 SUPPOSITORY, RECTAL RECTAL DAILY PRN
Status: DISCONTINUED | OUTPATIENT
Start: 2019-05-30 | End: 2019-05-30 | Stop reason: HOSPADM

## 2019-05-30 RX ADMIN — DOCUSATE SODIUM 100 MG: 100 CAPSULE, LIQUID FILLED ORAL at 09:15

## 2019-05-30 RX ADMIN — PRENATAL VIT W/ FE FUMARATE-FA TAB 27-0.8 MG 1 TABLET: 27-0.8 TAB at 09:16

## 2019-05-30 RX ADMIN — NIFEDIPINE 30 MG: 30 TABLET, FILM COATED, EXTENDED RELEASE ORAL at 09:16

## 2019-06-07 DIAGNOSIS — R00.2 PALPITATIONS: Primary | ICD-10-CM

## 2019-06-10 NOTE — PROGRESS NOTES
[facility] at [facility]  Consultation History and Physical  Nancy Little  1995    VISIT DATE:  06/11/19    PCP:   Provider, No Known  [facility]  [place] [id number]      CC:  Irregular Heart Beat; Dizziness; and Syncope      Problem List:  1.  Pre-eclampsia with most recent pregnancy.  She is on labetalol    History of Present Illness:  Nancy Little  Is a 24 y.o. female with pertinent cardiac history detailed above.  She was discharged from the hospital May 30 after having a vaginal delivery at 38 weeks and 5 days.  This was her first pregnancy.  She did undergo induction because of severe preeclampsia which developed on the day of her last OB appointment.  She was discharged initially on nifedipine but at her last postpartum visit Thursday last week her heart rates were elevated in the 130s to 140s.  Because of this nifedipine was stopped and she was started on labetalol 200 mg 3 times a day..  She is referred by Dr. Rader for evaluation of elevated heart rate .  However following the switch to labetalol the patient noted she was having symptomatic hypotension.  After taking her first dose she said she got severely dizzy and actually had a syncopal episode.  She has been taking only 100 mg once a day.  Her next OB appointment is Thursday of this week.  Prior to pregnancy she had no problems with palpitations tachycardia or syncope.  She denies any chest pain or shortness of breath.  Her baby is healthy and doing well      Patient Active Problem List    Diagnosis Date Noted   • Acute urinary retention 05/30/2019   • Pre-eclampsia 05/30/2019   • Vaginal delivery 05/28/2019       Allergies   Allergen Reactions   • Penicillins Rash       Social History     Socioeconomic History   • Marital status:      Spouse name: Not on file   • Number of children: Not on file   • Years of education: Not on file   • Highest education level: Not on file   Tobacco Use   • Smoking  "status: Never Smoker   • Smokeless tobacco: Never Used   Substance and Sexual Activity   • Alcohol use: No   • Drug use: No   • Sexual activity: Defer       Family History   Problem Relation Age of Onset   • No Known Problems Father    • Breast cancer Mother    • No Known Problems Sister    • Diabetes Maternal Grandfather        Current Medications:    Current Outpatient Medications:   •  bethanechol (URECHOLINE) 25 MG tablet, Take 25 mg by mouth 3 (Three) Times a Day., Disp: , Rfl: 6  •  ibuprofen (ADVIL,MOTRIN) 200 MG tablet, Take 200 mg by mouth every 6 (six) hours as needed for mild pain (1-3). Pt to stop before surgery, Disp: , Rfl:   •  Prenatal Vit-Fe Fumarate-FA (PRENATAL 27-1) 27-1 MG tablet tablet, Take  by mouth Daily., Disp: , Rfl:   •  metoprolol tartrate (LOPRESSOR) 25 MG tablet, Take 1 tablet by mouth 2 (Two) Times a Day., Disp: 60 tablet, Rfl: 3     Review of Systems   Cardiovascular: Positive for palpitations and syncope. Negative for chest pain, cyanosis, dyspnea on exertion and leg swelling.   All other systems reviewed and are negative.      Vitals:    06/11/19 0818   BP: 110/74   BP Location: Right arm   Patient Position: Sitting   Pulse: 89   SpO2: 98%   Weight: 50.8 kg (112 lb)   Height: 154.9 cm (61\")       Physical Exam   Constitutional: She is oriented to person, place, and time. She appears well-developed and well-nourished. No distress.   HENT:   Head: Normocephalic and atraumatic.   Eyes: EOM are normal.   Neck: Neck supple. No JVD present.   Cardiovascular: Normal rate, regular rhythm, normal heart sounds and intact distal pulses. Exam reveals no gallop and no friction rub.   No murmur heard.  Pulmonary/Chest: Effort normal and breath sounds normal. She has no rales.   Abdominal: Soft.   Musculoskeletal: Normal range of motion. She exhibits no edema.   Neurological: She is alert and oriented to person, place, and time.   Skin: Skin is warm and dry.       Diagnostic Data:    ECG 12 " Lead  Date/Time: 6/11/2019 8:34 AM  Performed by: Doni Conrad MD  Authorized by: Doni Conrad MD   Previous ECG: no previous ECG available  Rhythm: sinus rhythm  Rate: normal  BPM: 89  QRS axis: normal    Clinical impression: normal ECG          No results found for: CHLPL, TRIG, HDL, LDLDIRECT  Lab Results   Component Value Date    GLUCOSE 77 05/30/2019    BUN 9 05/30/2019    CREATININE 0.73 05/30/2019    CREATININE 0.73 05/30/2019     05/30/2019    K 4.1 05/30/2019     (H) 05/30/2019    CO2 25.0 05/30/2019     No results found for: HGBA1C  Lab Results   Component Value Date    WBC 12.93 (H) 05/30/2019    HGB 8.5 (L) 05/30/2019    HCT 26.4 (L) 05/30/2019     05/30/2019       Assessment:  Pre-Eclampsia  Rapid heart rates and palpitations    Plan:    -Patient's blood pressure is now well controlled and she is actually having symptomatic episodes of hypotension on the full dose of labetalol  -We will discontinue labetalol and start metoprolol 25 mg twice daily  -Palpitations and rapid heart rates may in part be driven by preeclampsia, delivery, as well as nifedipine side effect  -Holter monitor results reviewed and show  -We will follow-up lab work from OB office including thyroid function  -Taking baseline echo to evaluate for any structural abnormalities.  -I think that this will be a self-limited phenomena and hopefully we can take her off of metoprolol at our next follow-up visit in 4 weeks.    Addendum: Holter showed maximum heart rate 165 to 171 bpm which correlated with sinus tachycardia.  Patient had one episode of Mobitz 1 AV block with 1 nonconducted beat which was asymptomatic.      Doni Conrad MD

## 2019-06-11 ENCOUNTER — CONSULT (OUTPATIENT)
Dept: CARDIOLOGY | Facility: CLINIC | Age: 24
End: 2019-06-11

## 2019-06-11 ENCOUNTER — TELEPHONE (OUTPATIENT)
Dept: CARDIOLOGY | Facility: CLINIC | Age: 24
End: 2019-06-11

## 2019-06-11 VITALS
DIASTOLIC BLOOD PRESSURE: 74 MMHG | WEIGHT: 112 LBS | SYSTOLIC BLOOD PRESSURE: 110 MMHG | OXYGEN SATURATION: 98 % | HEIGHT: 61 IN | BODY MASS INDEX: 21.14 KG/M2 | HEART RATE: 89 BPM

## 2019-06-11 DIAGNOSIS — O14.93 PRE-ECLAMPSIA IN THIRD TRIMESTER: Primary | ICD-10-CM

## 2019-06-11 DIAGNOSIS — R00.2 PALPITATIONS: ICD-10-CM

## 2019-06-11 PROCEDURE — 93000 ELECTROCARDIOGRAM COMPLETE: CPT | Performed by: INTERNAL MEDICINE

## 2019-06-11 PROCEDURE — 99244 OFF/OP CNSLTJ NEW/EST MOD 40: CPT | Performed by: INTERNAL MEDICINE

## 2019-06-11 RX ORDER — LABETALOL 200 MG/1
200 TABLET, FILM COATED ORAL 3 TIMES DAILY
Refills: 0 | COMMUNITY
Start: 2019-06-06 | End: 2019-06-11 | Stop reason: ALTCHOICE

## 2019-06-11 RX ORDER — BETHANECHOL CHLORIDE 25 MG/1
25 TABLET ORAL 3 TIMES DAILY
Refills: 6 | COMMUNITY
Start: 2019-05-31 | End: 2019-07-09

## 2019-06-18 ENCOUNTER — HOSPITAL ENCOUNTER (OUTPATIENT)
Dept: CARDIOLOGY | Facility: HOSPITAL | Age: 24
Discharge: HOME | End: 2019-06-18
Admitting: INTERNAL MEDICINE

## 2019-06-18 VITALS — HEIGHT: 61 IN | WEIGHT: 112 LBS | BODY MASS INDEX: 21.14 KG/M2

## 2019-06-18 DIAGNOSIS — R00.2 PALPITATIONS: ICD-10-CM

## 2019-06-18 LAB
BH CV ECHO MEAS - AO MAX PG (FULL): 0.85 MMHG
BH CV ECHO MEAS - AO MAX PG: 4.4 MMHG
BH CV ECHO MEAS - AO MEAN PG (FULL): 0.8 MMHG
BH CV ECHO MEAS - AO MEAN PG: 2.5 MMHG
BH CV ECHO MEAS - AO V2 MAX: 105.3 CM/SEC
BH CV ECHO MEAS - AO V2 MEAN: 72.8 CM/SEC
BH CV ECHO MEAS - AO V2 VTI: 19.7 CM
BH CV ECHO MEAS - AVA(I,A): 2.5 CM^2
BH CV ECHO MEAS - AVA(I,D): 2.5 CM^2
BH CV ECHO MEAS - AVA(V,A): 2.7 CM^2
BH CV ECHO MEAS - AVA(V,D): 2.7 CM^2
BH CV ECHO MEAS - BSA(HAYCOCK): 1.5 M^2
BH CV ECHO MEAS - BSA: 1.5 M^2
BH CV ECHO MEAS - BZI_BMI: 21.2 KILOGRAMS/M^2
BH CV ECHO MEAS - BZI_METRIC_HEIGHT: 154.9 CM
BH CV ECHO MEAS - BZI_METRIC_WEIGHT: 50.8 KG
BH CV ECHO MEAS - EDV(CUBED): 68.4 ML
BH CV ECHO MEAS - EDV(TEICH): 73.8 ML
BH CV ECHO MEAS - EF(CUBED): 50.2 %
BH CV ECHO MEAS - EF(TEICH): 42.8 %
BH CV ECHO MEAS - ESV(CUBED): 34 ML
BH CV ECHO MEAS - ESV(TEICH): 42.2 ML
BH CV ECHO MEAS - FS: 20.8 %
BH CV ECHO MEAS - IVS/LVPW: 1.1
BH CV ECHO MEAS - IVSD: 0.82 CM
BH CV ECHO MEAS - LA DIMENSION: 2.8 CM
BH CV ECHO MEAS - LAD MAJOR: 3.6 CM
BH CV ECHO MEAS - LAT PEAK E' VEL: 12.3 CM/SEC
BH CV ECHO MEAS - LATERAL E/E' RATIO: 5.8
BH CV ECHO MEAS - LV MASS(C)D: 94.7 GRAMS
BH CV ECHO MEAS - LV MASS(C)DI: 64.2 GRAMS/M^2
BH CV ECHO MEAS - LV MAX PG: 3.6 MMHG
BH CV ECHO MEAS - LV MEAN PG: 1.7 MMHG
BH CV ECHO MEAS - LV V1 MAX: 94.8 CM/SEC
BH CV ECHO MEAS - LV V1 MEAN: 59.4 CM/SEC
BH CV ECHO MEAS - LV V1 VTI: 16.5 CM
BH CV ECHO MEAS - LVIDD: 4.1 CM
BH CV ECHO MEAS - LVIDS: 3.2 CM
BH CV ECHO MEAS - LVOT AREA (M): 2.8 CM^2
BH CV ECHO MEAS - LVOT AREA: 3 CM^2
BH CV ECHO MEAS - LVOT DIAM: 1.9 CM
BH CV ECHO MEAS - LVPWD: 0.75 CM
BH CV ECHO MEAS - MED PEAK E' VEL: 8.9 CM/SEC
BH CV ECHO MEAS - MEDIAL E/E' RATIO: 8
BH CV ECHO MEAS - MV A MAX VEL: 85.4 CM/SEC
BH CV ECHO MEAS - MV DEC TIME: 0.18 SEC
BH CV ECHO MEAS - MV E MAX VEL: 72.1 CM/SEC
BH CV ECHO MEAS - MV E/A: 0.84
BH CV ECHO MEAS - PA ACC SLOPE: 442.5 CM/SEC^2
BH CV ECHO MEAS - PA ACC TIME: 0.18 SEC
BH CV ECHO MEAS - PA MAX PG: 3.4 MMHG
BH CV ECHO MEAS - PA PR(ACCEL): -1.8 MMHG
BH CV ECHO MEAS - PA V2 MAX: 92.3 CM/SEC
BH CV ECHO MEAS - RAP SYSTOLE: 3 MMHG
BH CV ECHO MEAS - RVSP: 13 MMHG
BH CV ECHO MEAS - SI(CUBED): 23.3 ML/M^2
BH CV ECHO MEAS - SI(LVOT): 33.3 ML/M^2
BH CV ECHO MEAS - SI(TEICH): 21.4 ML/M^2
BH CV ECHO MEAS - SV(CUBED): 34.4 ML
BH CV ECHO MEAS - SV(LVOT): 49.2 ML
BH CV ECHO MEAS - SV(TEICH): 31.5 ML
BH CV ECHO MEAS - TAPSE (>1.6): 2.4 CM2
BH CV ECHO MEAS - TR MAX PG: 10 MMHG
BH CV ECHO MEAS - TR MAX VEL: 154.2 CM/SEC
BH CV ECHO MEASUREMENTS AVERAGE E/E' RATIO: 6.8
BH CV VAS BP RIGHT ARM: NORMAL MMHG
BH CV XLRA - RV BASE: 2.5 CM
BH CV XLRA - RV LENGTH: 6.2 CM
BH CV XLRA - RV MID: 2.6 CM
BH CV XLRA - TDI S': 14.4 CM/SEC
LEFT ATRIUM VOLUME INDEX: 22.3 ML/M^2
LEFT ATRIUM VOLUME: 33 ML
MAXIMAL PREDICTED HEART RATE: 195 BPM
STRESS TARGET HR: 166 BPM

## 2019-06-18 PROCEDURE — 93306 TTE W/DOPPLER COMPLETE: CPT | Performed by: INTERNAL MEDICINE

## 2019-06-18 PROCEDURE — 93306 TTE W/DOPPLER COMPLETE: CPT

## 2019-06-20 ENCOUNTER — TELEPHONE (OUTPATIENT)
Dept: CARDIOLOGY | Facility: CLINIC | Age: 24
End: 2019-06-20

## 2019-06-20 NOTE — TELEPHONE ENCOUNTER
----- Message from Doni Conrad MD sent at 6/19/2019  7:38 AM EDT -----  Mrs. Little has a normal echo

## 2019-07-08 NOTE — PROGRESS NOTES
[facility]  [facility] Up Visit  Nancy Little  1995    VISIT DATE:  07/09/19    PCP:   Provider, No Known  [facility]  [place] [id number]      CC:  Pre-Eclampsia and Palpitations      Problem List:  1.  Pre-eclampsia with most recent pregnancy.      48-hour Holter monitor showed one single episode of Mobitz 1 AV block.  Otherwise sinus tachycardia in sinus rhythm    Echo:  Ejection fraction 51 to 55% normal RV size and function no significant valve disease       History of Present Illness:  Nancy Little  Is a 24 y.o. female with pertinent cardiac history detailed above.  She was discharged from the hospital May 30 after having a vaginal delivery at 38 weeks and 5 days.  This was her first pregnancy.  She did undergo induction because of severe preeclampsia which developed on the day of her last OB appointment.    She was initially managed on nifedipine and then labetalol but had episodes with symptomatic hypotension and actually had an episode of syncope.  At our last visit I switched her labetalol to metoprolol to help with her rapid heart rates and palpitations, and hopefully avoid hypotension.  She had a normal echocardiogram.  Since last visit she is felt well with no palpitations.  No hypertension.  Also no symptomatic hypotension and no syncope.  Baby is doing well.  She has no other complaints today            Patient Active Problem List    Diagnosis Date Noted   • Acute urinary retention 05/30/2019   • Pre-eclampsia 05/30/2019   • Vaginal delivery 05/28/2019       Allergies   Allergen Reactions   • Penicillins Rash       Social History     Socioeconomic History   • Marital status:      Spouse name: Not on file   • Number of children: Not on file   • Years of education: Not on file   • Highest education level: Not on file   Tobacco Use   • Smoking status: Never Smoker   • Smokeless tobacco: Never Used   Substance and Sexual Activity   •  "Alcohol use: No   • Drug use: No   • Sexual activity: Defer       Family History   Problem Relation Age of Onset   • Leukemia Father    • Breast cancer Mother    • No Known Problems Sister    • Diabetes Maternal Grandfather        Current Medications:    Current Outpatient Medications:   •  ibuprofen (ADVIL,MOTRIN) 200 MG tablet, Take 200 mg by mouth every 6 (six) hours as needed for mild pain (1-3). Pt to stop before surgery, Disp: , Rfl:   •  metoprolol tartrate (LOPRESSOR) 25 MG tablet, Take 1 tablet by mouth 2 (Two) Times a Day., Disp: 60 tablet, Rfl: 3  •  Prenatal Vit-Fe Fumarate-FA (PRENATAL 27-1) 27-1 MG tablet tablet, Take 1 tablet by mouth Daily., Disp: , Rfl:      Review of Systems   Cardiovascular: Negative for chest pain, dyspnea on exertion, irregular heartbeat, leg swelling, near-syncope, orthopnea, palpitations and syncope.   All other systems reviewed and are negative.      Vitals:    07/09/19 1101   BP: 106/66   BP Location: Left arm   Patient Position: Sitting   Pulse: 89   SpO2: 98%   Weight: 50.3 kg (111 lb)   Height: 154.9 cm (61\")       Physical Exam   Constitutional: She is oriented to person, place, and time. She appears well-developed and well-nourished.   Cardiovascular: Normal rate, regular rhythm, normal heart sounds and intact distal pulses.   Pulmonary/Chest: Effort normal and breath sounds normal.   Musculoskeletal: She exhibits no edema.   Neurological: She is alert and oriented to person, place, and time.   Skin: Skin is warm and dry.       Diagnostic Data:  Procedures  No results found for: CHLPL, TRIG, HDL, LDLDIRECT  Lab Results   Component Value Date    GLUCOSE 77 05/30/2019    BUN 9 05/30/2019    CREATININE 0.73 05/30/2019    CREATININE 0.73 05/30/2019     05/30/2019    K 4.1 05/30/2019     (H) 05/30/2019    CO2 25.0 05/30/2019     No results found for: HGBA1C  Lab Results   Component Value Date    WBC 12.93 (H) 05/30/2019    HGB 8.5 (L) 05/30/2019    HCT 26.4 (L) " 05/30/2019     05/30/2019       Assessment:   Diagnosis Plan   1. Pre-eclampsia in third trimester         Plan:      -Holter monitor showed one episode of Mobitz 1 AV block otherwise sinus tachycardia  -Echo shows structurally normal heart with ejection fraction 51 to 55% and no significant valve disease  -Currently her palpitations as well as her hypertension have resolved.  -Believe her blood pressure will continue her pain normal now that she is status post delivery and out from her pregnancy a couple of months  -Not having any symptomatic palpitations  -I believe she can come off of the metoprolol.  I will plan to discontinue this as long as there is not any specific OB indication why she needs to remain on it.  She has follow-up with OB on Thursday.    I will see her back for another check in about 4 months                   Doni Conrad MD

## 2019-07-09 ENCOUNTER — OFFICE VISIT (OUTPATIENT)
Dept: CARDIOLOGY | Facility: CLINIC | Age: 24
End: 2019-07-09

## 2019-07-09 VITALS
SYSTOLIC BLOOD PRESSURE: 106 MMHG | DIASTOLIC BLOOD PRESSURE: 66 MMHG | WEIGHT: 111 LBS | HEART RATE: 89 BPM | BODY MASS INDEX: 20.96 KG/M2 | OXYGEN SATURATION: 98 % | HEIGHT: 61 IN

## 2019-07-09 DIAGNOSIS — O14.93 PRE-ECLAMPSIA IN THIRD TRIMESTER: Primary | ICD-10-CM

## 2019-07-09 PROCEDURE — 99213 OFFICE O/P EST LOW 20 MIN: CPT | Performed by: INTERNAL MEDICINE

## 2020-09-16 PROBLEM — O14.90 PRE-ECLAMPSIA: Status: RESOLVED | Noted: 2019-05-30 | Resolved: 2020-09-16

## 2020-09-16 PROBLEM — O09.299 HX OF PREECLAMPSIA, PRIOR PREGNANCY, CURRENTLY PREGNANT: Status: ACTIVE | Noted: 2020-09-16

## 2020-09-16 PROBLEM — R33.8 ACUTE URINARY RETENTION: Status: RESOLVED | Noted: 2019-05-30 | Resolved: 2020-09-16

## 2020-09-17 ENCOUNTER — ROUTINE PRENATAL (OUTPATIENT)
Dept: OBSTETRICS AND GYNECOLOGY | Facility: CLINIC | Age: 25
End: 2020-09-17

## 2020-09-17 VITALS — WEIGHT: 114 LBS | BODY MASS INDEX: 21.54 KG/M2 | DIASTOLIC BLOOD PRESSURE: 66 MMHG | SYSTOLIC BLOOD PRESSURE: 102 MMHG

## 2020-09-17 DIAGNOSIS — O09.299 HX OF PREECLAMPSIA, PRIOR PREGNANCY, CURRENTLY PREGNANT: Primary | ICD-10-CM

## 2020-09-17 DIAGNOSIS — Z3A.12 12 WEEKS GESTATION OF PREGNANCY: ICD-10-CM

## 2020-09-17 PROBLEM — Z34.90 PREGNANCY: Status: ACTIVE | Noted: 2020-09-17

## 2020-09-17 PROCEDURE — 0502F SUBSEQUENT PRENATAL CARE: CPT | Performed by: OBSTETRICS & GYNECOLOGY

## 2020-09-17 NOTE — PROGRESS NOTES
OB FOLLOW UP  CC- Here for care of pregnancy        Nancy Little is a 25 y.o.  12w6d patient being seen today for her obstetrical follow up visit. Patient reports nausea, vomiting, and heart burn..     Her prenatal care is complicated by (and status) :    Patient Active Problem List   Diagnosis   • Vaginal delivery   • Hx of preeclampsia, prior pregnancy, currently pregnant   • Pregnancy       The additional following portions of the patient's history were reviewed and updated as appropriate: allergies, current medications, past family history, past medical history, past social history, past surgical history and problem list.    ROS -   Patient Reports : No Problems  Patient Denies: Loss of Fluid, Vaginal Spotting, Vision Changes, Headaches and Cramping/Contractions   Fetal Movement : none    I have reviewed and agree with the HPI, ROS, and historical information as entered above. Cheyanne Vasquez MD    /66   Wt 51.7 kg (114 lb)   LMP 2020   BMI 21.54 kg/m²     Ultrasound Today: no    EXAM:   Vitals: See prenatal flowsheet   Abdomen: See prenatal flowsheet   Urine glucose/protein: See prenatal flowsheet   Pelvic: See prenatal flowsheet   HRT: See prenatal flowsheet   Presentation: See prenatal flowsheet   Movement: See prenatal flowsheet          Assessment and Plan    Problem List Items Addressed This Visit        Other    Hx of preeclampsia, prior pregnancy, currently pregnant - Primary    Overview     On baby aspirin, creatinine noted to be 0.98 at first visit. all other basic labs within normal limits         Relevant Orders    Protein, Urine, 24 Hour - Urine, Clean Catch    Comprehensive Metabolic Panel    Pregnancy    Overview     Vacuum assisted, tailbone pain since delivery 5#14oz               1. Pregnancy at 12w6d  2. Fetal status reassuring.   3. Activity and Exercise discussed.  Return in about 4 weeks (around 10/15/2020) for Next scheduled follow up.  4. Pepcid for acid  reflux.   5. Declines genetic testing.  6. Turned in 24-hour urine today for baseline protein.  Also recheck on creatinine.  It was 0.98 at last visit.    Cheyanne Vasquez MD  09/17/2020

## 2020-09-18 LAB
ALBUMIN SERPL-MCNC: 4.6 G/DL (ref 3.5–5.2)
ALBUMIN/GLOB SERPL: 2.7 G/DL
ALP SERPL-CCNC: 59 U/L (ref 39–117)
ALT SERPL-CCNC: 9 U/L (ref 1–33)
AST SERPL-CCNC: 10 U/L (ref 1–32)
BILIRUB SERPL-MCNC: 0.2 MG/DL (ref 0–1.2)
BUN SERPL-MCNC: 6 MG/DL (ref 6–20)
BUN/CREAT SERPL: 9 (ref 7–25)
CALCIUM SERPL-MCNC: 8.8 MG/DL (ref 8.6–10.5)
CHLORIDE SERPL-SCNC: 100 MMOL/L (ref 98–107)
CO2 SERPL-SCNC: 22.5 MMOL/L (ref 22–29)
CREAT SERPL-MCNC: 0.67 MG/DL (ref 0.57–1)
GLOBULIN SER CALC-MCNC: 1.7 GM/DL
GLUCOSE SERPL-MCNC: 92 MG/DL (ref 65–99)
POTASSIUM SERPL-SCNC: 3.9 MMOL/L (ref 3.5–5.2)
PROT 24H UR-MRATE: 141 MG/24 HR (ref 30–150)
PROT SERPL-MCNC: 6.3 G/DL (ref 6–8.5)
PROT UR-MCNC: 12.8 MG/DL
SODIUM SERPL-SCNC: 139 MMOL/L (ref 136–145)

## 2020-10-15 ENCOUNTER — ROUTINE PRENATAL (OUTPATIENT)
Dept: OBSTETRICS AND GYNECOLOGY | Facility: CLINIC | Age: 25
End: 2020-10-15

## 2020-10-15 VITALS — BODY MASS INDEX: 21.73 KG/M2 | DIASTOLIC BLOOD PRESSURE: 62 MMHG | WEIGHT: 115 LBS | SYSTOLIC BLOOD PRESSURE: 100 MMHG

## 2020-10-15 DIAGNOSIS — Z3A.16 16 WEEKS GESTATION OF PREGNANCY: Primary | ICD-10-CM

## 2020-10-15 DIAGNOSIS — O09.299 HX OF PREECLAMPSIA, PRIOR PREGNANCY, CURRENTLY PREGNANT: ICD-10-CM

## 2020-10-15 LAB
GLUCOSE UR STRIP-MCNC: NEGATIVE MG/DL
PROT UR STRIP-MCNC: NEGATIVE MG/DL

## 2020-10-15 PROCEDURE — 0502F SUBSEQUENT PRENATAL CARE: CPT | Performed by: OBSTETRICS & GYNECOLOGY

## 2020-10-15 NOTE — PROGRESS NOTES
OB FOLLOW UP  CC- Here for care of pregnancy        Nancy Little is a 25 y.o.  16w6d patient being seen today for her obstetrical follow up visit. Patient reports no complaints.     Her prenatal care is complicated by (and status) :    Patient Active Problem List   Diagnosis   • Vaginal delivery   • Hx of preeclampsia, prior pregnancy, currently pregnant   • Pregnancy       Flu Status: Will give in office today    The additional following portions of the patient's history were reviewed and updated as appropriate: allergies, current medications, past family history, past medical history, past social history, past surgical history and problem list.    ROS -   Patient Reports : No Problems  Patient Denies: Loss of Fluid, Vaginal Spotting, Vision Changes, Headaches and Cramping/Contractions   Fetal Movement : normal  All other systems reviewed and are negative.     I have reviewed and agree with the HPI, ROS, and historical information as entered above. Cheyanne Vasquez MD    /62   Wt 52.2 kg (115 lb)   LMP 2020   BMI 21.73 kg/m²     Ultrasound Today: no    EXAM:   Vitals: See prenatal flowsheet   Abdomen: See prenatal flowsheet   Urine glucose/protein: See prenatal flowsheet   Pelvic: See prenatal flowsheet   HRT: See prenatal flowsheet   Presentation: See prenatal flowsheet   Movement: See prenatal flowsheet          Assessment and Plan    Problem List Items Addressed This Visit        Other    Vaginal delivery    Hx of preeclampsia, prior pregnancy, currently pregnant    Overview     On baby aspirin, creatinine noted to be 0.98 at first visit.  Repeat at 12 weeks was 0.67.  Baseline 24-hour urine was 141.         Pregnancy - Primary    Overview     Vacuum assisted, tailbone pain since delivery 5#14oz         Relevant Orders    POC Urinalysis Dipstick (Completed)    US Ob 14 + Weeks Single or First Gestation          1. Pregnancy at 16w6d.  Anatomy ultrasound next visit  2. Fetal  status reassuring.   3. Activity and Exercise discussed.  Return in about 4 weeks (around 11/12/2020) for anatomy usg.    Cheyanne Vasquez MD  10/15/2020

## 2020-11-12 ENCOUNTER — ROUTINE PRENATAL (OUTPATIENT)
Dept: OBSTETRICS AND GYNECOLOGY | Facility: CLINIC | Age: 25
End: 2020-11-12

## 2020-11-12 VITALS — SYSTOLIC BLOOD PRESSURE: 102 MMHG | BODY MASS INDEX: 22.13 KG/M2 | WEIGHT: 117.1 LBS | DIASTOLIC BLOOD PRESSURE: 64 MMHG

## 2020-11-12 DIAGNOSIS — Z3A.20 20 WEEKS GESTATION OF PREGNANCY: ICD-10-CM

## 2020-11-12 DIAGNOSIS — O09.299 HX OF PREECLAMPSIA, PRIOR PREGNANCY, CURRENTLY PREGNANT: Primary | ICD-10-CM

## 2020-11-12 LAB
EXPIRATION DATE: 0
GLUCOSE UR STRIP-MCNC: NEGATIVE MG/DL
Lab: 0
PROT UR STRIP-MCNC: NEGATIVE MG/DL

## 2020-11-12 PROCEDURE — 0502F SUBSEQUENT PRENATAL CARE: CPT | Performed by: OBSTETRICS & GYNECOLOGY

## 2020-11-12 RX ORDER — FAMOTIDINE 10 MG
10 TABLET ORAL 2 TIMES DAILY
COMMUNITY
End: 2020-12-04 | Stop reason: ALTCHOICE

## 2020-11-12 RX ORDER — LABETALOL 200 MG/1
TABLET, FILM COATED ORAL
COMMUNITY
End: 2021-02-18

## 2020-11-12 NOTE — PROGRESS NOTES
OB FOLLOW UP  CC- Here for care of pregnancy        Nancy Little is a 25 y.o.  20w6d patient being seen today for her obstetrical follow up visit. Patient reports no complaints.     Her prenatal care is complicated by (and status) :    Patient Active Problem List   Diagnosis   • Hx of preeclampsia, prior pregnancy, currently pregnant   • Pregnancy       Flu Status: Desires at future appt  Ultrasound Today: Yes.  Findings showed normal fetal anatomy.  Male fetus..  I have personally evaluated the U/S and agree with the findings. Cheyanne Vasquez MD    ROS -   Patient Reports : No Problems  Patient Denies: Loss of Fluid, Vaginal Spotting, Vision Changes, Headaches, Nausea , Vomiting , Contractions and Epigastric pain  Fetal Movement : normal  All other systems reviewed and are negative.       The additional following portions of the patient's history were reviewed and updated as appropriate: allergies, current medications, past family history, past medical history, past social history, past surgical history and problem list.    I have reviewed and agree with the HPI, ROS, and historical information as entered above. Cheyanne Vasquez MD    /64   Wt 53.1 kg (117 lb 1.6 oz)   LMP 2020   BMI 22.13 kg/m²       EXAM:     FHT: Positive on ultrasound   Abdomen nondistended nontender and gravid.  Pelvic Exam: No    Urine glucose/protein: See prenatal flowsheet       Assessment and Plan    Problem List Items Addressed This Visit        Other    Hx of preeclampsia, prior pregnancy, currently pregnant - Primary    Overview     On baby aspirin, creatinine noted to be 0.98 at first visit.  Repeat at 12 weeks was 0.67.  Baseline 24-hour urine was 141.         Pregnancy    Overview     Vacuum assisted, tailbone pain since delivery 5#14oz  Boy!         Relevant Orders    POC Glucose, Urine, Qualitative, Dipstick (Completed)    POC Protein, Urine, Qualitative, Dipstick (Completed)           1. Pregnancy at 20w6d  2. Fetal status reassuring.   3. Activity and Exercise discussed.  Return in about 4 weeks (around 12/10/2020).    Cheyanne Vasquez MD  11/12/2020

## 2020-12-04 ENCOUNTER — TELEPHONE (OUTPATIENT)
Dept: OBSTETRICS AND GYNECOLOGY | Facility: CLINIC | Age: 25
End: 2020-12-04

## 2020-12-04 DIAGNOSIS — K21.00 GASTROESOPHAGEAL REFLUX DISEASE WITH ESOPHAGITIS WITHOUT HEMORRHAGE: Primary | ICD-10-CM

## 2020-12-04 RX ORDER — PANTOPRAZOLE SODIUM 20 MG/1
20 TABLET, DELAYED RELEASE ORAL DAILY
Qty: 30 TABLET | Refills: 6 | Status: SHIPPED | OUTPATIENT
Start: 2020-12-04 | End: 2021-12-04

## 2020-12-04 NOTE — TELEPHONE ENCOUNTER
PT CALLED & IS HAVING REALLY BAD HEARTBURN, SHES BEEN TAKING OTC FOR IT BUT IT'S NO LONGER WORKING AND SHES BEING WOKE UP AT NIGHT WITH IT.

## 2020-12-04 NOTE — TELEPHONE ENCOUNTER
24 wks and taking Pepcid and Tums daily. She has modified her diet but still continues to struggle with severe heartburn. Wondering if there is something stronger she can take.

## 2020-12-10 ENCOUNTER — ROUTINE PRENATAL (OUTPATIENT)
Dept: OBSTETRICS AND GYNECOLOGY | Facility: CLINIC | Age: 25
End: 2020-12-10

## 2020-12-10 VITALS — WEIGHT: 120 LBS | BODY MASS INDEX: 22.67 KG/M2 | DIASTOLIC BLOOD PRESSURE: 62 MMHG | SYSTOLIC BLOOD PRESSURE: 102 MMHG

## 2020-12-10 DIAGNOSIS — O09.299 HX OF PREECLAMPSIA, PRIOR PREGNANCY, CURRENTLY PREGNANT: ICD-10-CM

## 2020-12-10 DIAGNOSIS — Z3A.24 24 WEEKS GESTATION OF PREGNANCY: Primary | ICD-10-CM

## 2020-12-10 LAB
GLUCOSE UR STRIP-MCNC: NEGATIVE MG/DL
PROT UR STRIP-MCNC: NEGATIVE MG/DL

## 2020-12-10 PROCEDURE — 0502F SUBSEQUENT PRENATAL CARE: CPT | Performed by: OBSTETRICS & GYNECOLOGY

## 2020-12-10 PROCEDURE — 90686 IIV4 VACC NO PRSV 0.5 ML IM: CPT | Performed by: OBSTETRICS & GYNECOLOGY

## 2020-12-10 PROCEDURE — 90471 IMMUNIZATION ADMIN: CPT | Performed by: OBSTETRICS & GYNECOLOGY

## 2020-12-10 NOTE — PROGRESS NOTES
OB FOLLOW UP  CC- Here for care of pregnancy        Nancy Little is a 25 y.o.  24w6d patient being seen today for her obstetrical follow up visit. Patient reports no complaints.     Her prenatal care is complicated by (and status) :    Patient Active Problem List   Diagnosis   • Hx of preeclampsia, prior pregnancy, currently pregnant   • Pregnancy       Flu Status: Will give in office today  Ultrasound Today: No.    ROS -   Patient Reports : No Problems  Patient Denies: Loss of Fluid, Vaginal Spotting, Vision Changes, Headaches and Contractions  Fetal Movement : normal  All other systems reviewed and are negative.       The additional following portions of the patient's history were reviewed and updated as appropriate: allergies, current medications, past family history, past medical history, past social history, past surgical history and problem list.    I have reviewed and agree with the HPI, ROS, and historical information as entered above. Cheyanne Vasquez MD    /62   Wt 54.4 kg (120 lb)   LMP 2020   BMI 22.67 kg/m²       EXAM:     FHT: 141 BPM   Uterine Size: 24 cm  Pelvic Exam: No    Urine glucose/protein: See prenatal flowsheet       Assessment and Plan    Problem List Items Addressed This Visit        Other    Hx of preeclampsia, prior pregnancy, currently pregnant    Overview     On baby aspirin, creatinine noted to be 0.98 at first visit.  Repeat at 12 weeks was 0.67.  Baseline 24-hour urine was 141.         Pregnancy - Primary    Overview     Vacuum assisted, tailbone pain since delivery 5#14oz  Boy!         Relevant Orders    POC Urinalysis Dipstick (Completed)          1. Pregnancy at 24w6d  2. Fetal status reassuring.   3. Activity and Exercise discussed.  Return in about 4 weeks (around 2021) for glucola.    Cheyanne Vasquez MD  12/10/2020

## 2021-01-05 ENCOUNTER — ROUTINE PRENATAL (OUTPATIENT)
Dept: OBSTETRICS AND GYNECOLOGY | Facility: CLINIC | Age: 26
End: 2021-01-05

## 2021-01-05 VITALS — SYSTOLIC BLOOD PRESSURE: 100 MMHG | DIASTOLIC BLOOD PRESSURE: 60 MMHG | WEIGHT: 124 LBS | BODY MASS INDEX: 23.43 KG/M2

## 2021-01-05 DIAGNOSIS — O09.299 HX OF PREECLAMPSIA, PRIOR PREGNANCY, CURRENTLY PREGNANT: ICD-10-CM

## 2021-01-05 DIAGNOSIS — Z3A.28 28 WEEKS GESTATION OF PREGNANCY: Primary | ICD-10-CM

## 2021-01-05 LAB
GLUCOSE UR STRIP-MCNC: NEGATIVE MG/DL
PROT UR STRIP-MCNC: NEGATIVE MG/DL

## 2021-01-05 PROCEDURE — 0502F SUBSEQUENT PRENATAL CARE: CPT | Performed by: OBSTETRICS & GYNECOLOGY

## 2021-01-05 RX ORDER — BREAST PUMP
EACH MISCELLANEOUS
Qty: 1 EACH | Refills: 0 | Status: SHIPPED | OUTPATIENT
Start: 2021-01-05

## 2021-01-05 NOTE — PROGRESS NOTES
OB FOLLOW UP  CC- Here for care of pregnancy        Nancy Little is a 25 y.o.  28w4d patient being seen today for her obstetrical follow up. Patient reports no complaints.     Patient undergoing Glucola testing today. She is due for her testing at 1105.     MBT: A+  Rhogam Given: N/A  TDAP:next visit, out of stock today  Breast Pump: prescription given  Flu Status: Already given in current flu season  Ultrasound Today: No.    Her prenatal care is complicated by (and status) :    Patient Active Problem List   Diagnosis   • Hx of preeclampsia, prior pregnancy, currently pregnant   • Pregnancy         ROS -   Patient Reports : No Problems  Patient Denies: Loss of Fluid, Vaginal Spotting, Vision Changes, Headaches and Contractions  Fetal Movement : normal    The additional following portions of the patient's history were reviewed and updated as appropriate: allergies, current medications, past family history, past medical history, past social history, past surgical history and problem list.    I have reviewed and agree with the HPI, ROS, and historical information as entered above. Cheyanne Vasquez MD    /60   Wt 56.2 kg (124 lb)   LMP 2020   BMI 23.43 kg/m²         EXAM:     FHT: 150 BPM   Uterine Size: 28 cm  Pelvic Exam: No       Assessment and Plan    Problem List Items Addressed This Visit        Other    Hx of preeclampsia, prior pregnancy, currently pregnant    Overview     On baby aspirin, creatinine noted to be 0.98 at first visit.  Repeat at 12 weeks was 0.67.  Baseline 24-hour urine was 141.         Pregnancy - Primary    Overview     Vacuum assisted, tailbone pain since delivery 5#14oz  Boy!         Relevant Orders    Antibody Screen    Gestational Screen 1 Hr ([facility])    CBC (No Diff)    POC Urinalysis Dipstick (Completed)          1. Pregnancy at 28w4d  2. Fetal status reassuring.   3. Activity and Exercise discussed.  Return in about 4 weeks (around 2021).    Cheyanne  Apoorva Vasquez MD  01/05/2021

## 2021-01-06 LAB
BLD GP AB SCN SERPL QL: NEGATIVE
ERYTHROCYTE [DISTWIDTH] IN BLOOD BY AUTOMATED COUNT: 12.5 % (ref 12.3–15.4)
GLUCOSE 1H P 50 G GLC PO SERPL-MCNC: 125 MG/DL (ref 65–139)
HCT VFR BLD AUTO: 34.3 % (ref 34–46.6)
HGB BLD-MCNC: 11.4 G/DL (ref 12–15.9)
MCH RBC QN AUTO: 30.4 PG (ref 26.6–33)
MCHC RBC AUTO-ENTMCNC: 33.2 G/DL (ref 31.5–35.7)
MCV RBC AUTO: 91.5 FL (ref 79–97)
PLATELET # BLD AUTO: 201 10*3/MM3 (ref 140–450)
RBC # BLD AUTO: 3.75 10*6/MM3 (ref 3.77–5.28)
WBC # BLD AUTO: 11.05 10*3/MM3 (ref 3.4–10.8)

## 2021-02-02 ENCOUNTER — ROUTINE PRENATAL (OUTPATIENT)
Dept: OBSTETRICS AND GYNECOLOGY | Facility: CLINIC | Age: 26
End: 2021-02-02

## 2021-02-02 VITALS — SYSTOLIC BLOOD PRESSURE: 100 MMHG | BODY MASS INDEX: 23.24 KG/M2 | DIASTOLIC BLOOD PRESSURE: 62 MMHG | WEIGHT: 123 LBS

## 2021-02-02 DIAGNOSIS — O09.299 HX OF PREECLAMPSIA, PRIOR PREGNANCY, CURRENTLY PREGNANT: ICD-10-CM

## 2021-02-02 DIAGNOSIS — Z3A.32 32 WEEKS GESTATION OF PREGNANCY: Primary | ICD-10-CM

## 2021-02-02 LAB
GLUCOSE UR STRIP-MCNC: NEGATIVE MG/DL
PROT UR STRIP-MCNC: NEGATIVE MG/DL

## 2021-02-02 PROCEDURE — 0502F SUBSEQUENT PRENATAL CARE: CPT | Performed by: OBSTETRICS & GYNECOLOGY

## 2021-02-18 ENCOUNTER — ROUTINE PRENATAL (OUTPATIENT)
Dept: OBSTETRICS AND GYNECOLOGY | Facility: CLINIC | Age: 26
End: 2021-02-18

## 2021-02-18 VITALS — BODY MASS INDEX: 23.62 KG/M2 | SYSTOLIC BLOOD PRESSURE: 102 MMHG | DIASTOLIC BLOOD PRESSURE: 60 MMHG | WEIGHT: 125 LBS

## 2021-02-18 DIAGNOSIS — O26.849 UTERINE SIZE DATE DISCREPANCY PREGNANCY: ICD-10-CM

## 2021-02-18 DIAGNOSIS — Z3A.34 34 WEEKS GESTATION OF PREGNANCY: Primary | ICD-10-CM

## 2021-02-18 DIAGNOSIS — O09.299 HX OF PREECLAMPSIA, PRIOR PREGNANCY, CURRENTLY PREGNANT: ICD-10-CM

## 2021-02-18 LAB
GLUCOSE UR STRIP-MCNC: NEGATIVE MG/DL
PROT UR STRIP-MCNC: NEGATIVE MG/DL

## 2021-02-18 PROCEDURE — 90471 IMMUNIZATION ADMIN: CPT | Performed by: OBSTETRICS & GYNECOLOGY

## 2021-02-18 PROCEDURE — 90715 TDAP VACCINE 7 YRS/> IM: CPT | Performed by: OBSTETRICS & GYNECOLOGY

## 2021-02-18 PROCEDURE — 0502F SUBSEQUENT PRENATAL CARE: CPT | Performed by: OBSTETRICS & GYNECOLOGY

## 2021-02-18 NOTE — PROGRESS NOTES
OB FOLLOW UP  CC- Here for care of pregnancy        Nancy Little is a 26 y.o.  34w6d patient being seen today for her obstetrical follow up visit. Patient reports a different d/c. .     Her prenatal care is complicated by (and status) :    Patient Active Problem List   Diagnosis   • Hx of preeclampsia, prior pregnancy, currently pregnant   • Pregnancy       Flu Status: Already given in current flu season  Ultrasound Today: No.    ROS -   Patient Reports : Loss of Fluid  Patient Denies: Vaginal Spotting, Vision Changes, Headaches and Contractions  Fetal Movement : normal  All other systems reviewed and are negative.       The additional following portions of the patient's history were reviewed and updated as appropriate: allergies, current medications, past family history, past medical history, past social history, past surgical history and problem list.    I have reviewed and agree with the HPI, ROS, and historical information as entered above. Cheyanne Vasquez MD    /60   Wt 56.7 kg (125 lb)   LMP 2020   BMI 23.62 kg/m²       EXAM:     FHT: 144 BPM   Uterine Size: size less than dates  33cm  Pelvic Exam: Sterile speculum exam negative for pool phone or nitrazine    Urine glucose/protein: See prenatal flowsheet       Assessment and Plan    Problem List Items Addressed This Visit        Gravid and     Hx of preeclampsia, prior pregnancy, currently pregnant    Overview     On baby aspirin, creatinine noted to be 0.98 at first visit.  Repeat at 12 weeks was 0.67.  Baseline 24-hour urine was 141.         Relevant Orders    US Ob Follow Up Transabdominal Approach    US Fetal Biophysical Profile;Without Non-Stress Testing    Pregnancy - Primary    Overview     Vacuum assisted-pushed 20 minutes.  Had urinary retention requiring catheterization for a week after delivery.  Tailbone pain since delivery 5#14oz.  Patient considering out right  delivery.  Will reassess with  ultrasound for estimated fetal weight at 36 to 37 weeks.  Boy!         Relevant Orders    POC Urinalysis Dipstick (Completed)      Other Visit Diagnoses     Uterine size date discrepancy pregnancy        Relevant Orders    US Ob Follow Up Transabdominal Approach    US Fetal Biophysical Profile;Without Non-Stress Testing          1. Pregnancy at 34w6d  2. Fetal status reassuring.   3. Activity and Exercise discussed.  Return in about 2 weeks (around 3/4/2021) for ultrasound.    Cheyanne Vasquez MD  02/18/2021

## 2021-02-24 DIAGNOSIS — K21.00 GASTROESOPHAGEAL REFLUX DISEASE WITH ESOPHAGITIS WITHOUT HEMORRHAGE: ICD-10-CM

## 2021-02-24 RX ORDER — PANTOPRAZOLE SODIUM 20 MG/1
TABLET, DELAYED RELEASE ORAL
Qty: 90 TABLET | OUTPATIENT
Start: 2021-02-24

## 2021-03-03 ENCOUNTER — ROUTINE PRENATAL (OUTPATIENT)
Dept: OBSTETRICS AND GYNECOLOGY | Facility: CLINIC | Age: 26
End: 2021-03-03

## 2021-03-03 ENCOUNTER — LAB (OUTPATIENT)
Dept: LAB | Facility: HOSPITAL | Age: 26
End: 2021-03-03

## 2021-03-03 VITALS — BODY MASS INDEX: 23.96 KG/M2 | WEIGHT: 126.8 LBS | SYSTOLIC BLOOD PRESSURE: 100 MMHG | DIASTOLIC BLOOD PRESSURE: 70 MMHG

## 2021-03-03 DIAGNOSIS — Z3A.36 36 WEEKS GESTATION OF PREGNANCY: Primary | ICD-10-CM

## 2021-03-03 DIAGNOSIS — Z34.90 PREGNANCY, UNSPECIFIED GESTATIONAL AGE: Primary | ICD-10-CM

## 2021-03-03 DIAGNOSIS — Z3A.36 36 WEEKS GESTATION OF PREGNANCY: ICD-10-CM

## 2021-03-03 LAB
EXPIRATION DATE: 0
GLUCOSE UR STRIP-MCNC: NEGATIVE MG/DL
Lab: 0
PROT UR STRIP-MCNC: NEGATIVE MG/DL

## 2021-03-03 PROCEDURE — 87081 CULTURE SCREEN ONLY: CPT

## 2021-03-03 PROCEDURE — 0502F SUBSEQUENT PRENATAL CARE: CPT | Performed by: OBSTETRICS & GYNECOLOGY

## 2021-03-03 NOTE — PROGRESS NOTES
OB FOLLOW UP  CC- Here for care of pregnancy        Nancy Little is a 26 y.o.  36w5d patient being seen today for her obstetrical follow up visit. Patient reports backache and [place] Carmona.     Her prenatal care is complicated by (and status):    Patient Active Problem List   Diagnosis   • Hx of preeclampsia, prior pregnancy, currently pregnant   • Pregnancy         Flu Status: Already given in current flu season  GBS Status: Done Today  Her Delivery Plan is: Desires IOL at 39wks. Needs to be scheduled  Ultrasound Today: Yes.  Findings showed BPP 8/8, EFW 21%.  I have personally evaluated the U/S and agree with the findings. Cheyanne Vasquez MD    ROS -   Patient Reports : [place] Carmona and back pain  Patient Denies: Loss of Fluid, Vaginal Spotting, Vision Changes, Headaches, Nausea , Vomiting  and Epigastric pain  Fetal Movement : normal  All other systems reviewed and are negative.       The additional following portions of the patient's history were reviewed and updated as appropriate: allergies, current medications, past family history, past medical history, past social history, past surgical history and problem list.    I have reviewed and agree with the HPI, ROS, and historical information as entered above. Cheyanne Vasquez MD        /70   Wt 57.5 kg (126 lb 12.8 oz)   LMP 2020   BMI 23.96 kg/m²     EXAM:     FHT: 128 BPM   Uterine Size: size equals dates  Pelvic Exam: Yes.  Presentation: cephalic. Dilation: 2cm. Effacement: 75%. Station: -2.    Urine glucose/protein: See prenatal flowsheet       Assessment and Plan    Problem List Items Addressed This Visit        Gravid and     Pregnancy - Primary    Overview     Vacuum assisted-pushed 20 minutes.  Had urinary retention requiring catheterization for a week after delivery.  Tailbone pain since delivery 5#14oz. ultrasound at 36+5 weeks estimates weight and 5 pounds 12 ounces.  No significant injury following  patient's last vaginal delivery, she desires now right  section.  We will schedule at 39 weeks.  She is considering delivering vaginally if she delivers before 37-1/2 weeks.  Boy!         Relevant Orders    Group B Streptococcus Culture - Swab, Vagina    POC Glucose, Urine, Qualitative, Dipstick (Completed)    POC Protein, Urine, Qualitative, Dipstick (Completed)          1. Pregnancy at 36w5d  2. Fetal status reassuring.   3. Activity and Exercise discussed.  No follow-ups on file.    Cheyanne Vasquez MD  2021

## 2021-03-06 LAB — BACTERIA SPEC AEROBE CULT: NORMAL

## 2021-03-11 ENCOUNTER — PREP FOR SURGERY (OUTPATIENT)
Dept: OTHER | Facility: HOSPITAL | Age: 26
End: 2021-03-11

## 2021-03-11 ENCOUNTER — ROUTINE PRENATAL (OUTPATIENT)
Dept: OBSTETRICS AND GYNECOLOGY | Facility: CLINIC | Age: 26
End: 2021-03-11

## 2021-03-11 VITALS — WEIGHT: 128.4 LBS | SYSTOLIC BLOOD PRESSURE: 100 MMHG | DIASTOLIC BLOOD PRESSURE: 64 MMHG | BODY MASS INDEX: 24.26 KG/M2

## 2021-03-11 DIAGNOSIS — Z3A.37 37 WEEKS GESTATION OF PREGNANCY: Primary | ICD-10-CM

## 2021-03-11 DIAGNOSIS — Z3A.39 39 WEEKS GESTATION OF PREGNANCY: Primary | ICD-10-CM

## 2021-03-11 LAB
GLUCOSE UR STRIP-MCNC: NEGATIVE MG/DL
PROT UR STRIP-MCNC: NEGATIVE MG/DL

## 2021-03-11 PROCEDURE — 0502F SUBSEQUENT PRENATAL CARE: CPT | Performed by: NURSE PRACTITIONER

## 2021-03-11 RX ORDER — ONDANSETRON 4 MG/1
4 TABLET, FILM COATED ORAL EVERY 6 HOURS PRN
Status: CANCELLED | OUTPATIENT
Start: 2021-03-11

## 2021-03-11 RX ORDER — SODIUM CHLORIDE 0.9 % (FLUSH) 0.9 %
3 SYRINGE (ML) INJECTION EVERY 12 HOURS SCHEDULED
Status: CANCELLED | OUTPATIENT
Start: 2021-03-11

## 2021-03-11 RX ORDER — KETOROLAC TROMETHAMINE 30 MG/ML
30 INJECTION, SOLUTION INTRAMUSCULAR; INTRAVENOUS ONCE
Status: CANCELLED | OUTPATIENT
Start: 2021-03-11 | End: 2021-03-11

## 2021-03-11 RX ORDER — LIDOCAINE HYDROCHLORIDE 10 MG/ML
5 INJECTION, SOLUTION EPIDURAL; INFILTRATION; INTRACAUDAL; PERINEURAL AS NEEDED
Status: CANCELLED | OUTPATIENT
Start: 2021-03-11

## 2021-03-11 RX ORDER — ONDANSETRON 2 MG/ML
4 INJECTION INTRAMUSCULAR; INTRAVENOUS EVERY 6 HOURS PRN
Status: CANCELLED | OUTPATIENT
Start: 2021-03-11

## 2021-03-11 RX ORDER — SODIUM CHLORIDE, SODIUM LACTATE, POTASSIUM CHLORIDE, CALCIUM CHLORIDE 600; 310; 30; 20 MG/100ML; MG/100ML; MG/100ML; MG/100ML
125 INJECTION, SOLUTION INTRAVENOUS CONTINUOUS
Status: CANCELLED | OUTPATIENT
Start: 2021-03-11

## 2021-03-11 RX ORDER — OXYTOCIN-SODIUM CHLORIDE 0.9% IV SOLN 30 UNIT/500ML 30-0.9/5 UT/ML-%
650 SOLUTION INTRAVENOUS ONCE
Status: CANCELLED | OUTPATIENT
Start: 2021-03-11 | End: 2021-03-11

## 2021-03-11 RX ORDER — OXYTOCIN-SODIUM CHLORIDE 0.9% IV SOLN 30 UNIT/500ML 30-0.9/5 UT/ML-%
85 SOLUTION INTRAVENOUS ONCE
Status: CANCELLED | OUTPATIENT
Start: 2021-03-11 | End: 2021-03-11

## 2021-03-11 RX ORDER — TRISODIUM CITRATE DIHYDRATE AND CITRIC ACID MONOHYDRATE 500; 334 MG/5ML; MG/5ML
30 SOLUTION ORAL ONCE
Status: CANCELLED | OUTPATIENT
Start: 2021-03-11 | End: 2021-03-11

## 2021-03-11 RX ORDER — SODIUM CHLORIDE 0.9 % (FLUSH) 0.9 %
1-10 SYRINGE (ML) INJECTION AS NEEDED
Status: CANCELLED | OUTPATIENT
Start: 2021-03-11

## 2021-03-11 RX ORDER — MISOPROSTOL 200 UG/1
800 TABLET ORAL AS NEEDED
Status: CANCELLED | OUTPATIENT
Start: 2021-03-11

## 2021-03-11 RX ORDER — ACETAMINOPHEN 500 MG
1000 TABLET ORAL ONCE
Status: CANCELLED | OUTPATIENT
Start: 2021-03-11 | End: 2021-03-11

## 2021-03-11 RX ORDER — METHYLERGONOVINE MALEATE 0.2 MG/ML
200 INJECTION INTRAVENOUS AS NEEDED
Status: CANCELLED | OUTPATIENT
Start: 2021-03-11

## 2021-03-11 RX ORDER — CARBOPROST TROMETHAMINE 250 UG/ML
250 INJECTION, SOLUTION INTRAMUSCULAR AS NEEDED
Status: CANCELLED | OUTPATIENT
Start: 2021-03-11

## 2021-03-11 RX ORDER — CEFAZOLIN SODIUM 2 G/100ML
2 INJECTION, SOLUTION INTRAVENOUS ONCE
Status: CANCELLED | OUTPATIENT
Start: 2021-03-11 | End: 2021-03-11

## 2021-03-11 RX ORDER — HYDROMORPHONE HYDROCHLORIDE 1 MG/ML
0.5 INJECTION, SOLUTION INTRAMUSCULAR; INTRAVENOUS; SUBCUTANEOUS
Status: CANCELLED | OUTPATIENT
Start: 2021-03-11 | End: 2021-03-21

## 2021-03-11 NOTE — PROGRESS NOTES
OB FOLLOW UP  CC- Here for care of pregnancy        Nancy Little is a 26 y.o.  37w6d patient being seen today for her obstetrical follow up visit. Patient reports backache, headache and occasional contractions.     Her prenatal care is complicated by (and status) :    Patient Active Problem List   Diagnosis   • Hx of preeclampsia, prior pregnancy, currently pregnant   • Pregnancy         Flu Status: Already given in current flu season  GBS Status: Was already done and it was negative.   Her Delivery Plan is: Repeat . Scheduled  Ultrasound Today: No.    ROS -   Patient Reports : Contractions  Patient Denies: Loss of Fluid, Vaginal Spotting, Vision Changes, Headaches, Nausea , Vomiting  and Epigastric pain  Fetal Movement : normal  All other systems reviewed and are negative.       The additional following portions of the patient's history were reviewed and updated as appropriate: allergies, current medications, past family history, past medical history, past social history, past surgical history and problem list.    I have reviewed and agree with the HPI, ROS, and historical information as entered above. Maren Ventura, APRN        /64   Wt 58.2 kg (128 lb 6.4 oz)   LMP 2020   BMI 24.26 kg/m²     EXAM:     FHT: pos BPM   Uterine Size: size equals dates  Pelvic Exam: No    Urine glucose/protein: See prenatal flowsheet       Assessment and Plan    Problem List Items Addressed This Visit        Gravid and     Pregnancy - Primary    Overview     Vacuum assisted-pushed 20 minutes.  Had urinary retention requiring catheterization for a week after delivery.  Tailbone pain since delivery 5#14oz. ultrasound at 36+5 weeks estimates weight and 5 pounds 12 ounces.  No significant injury following patient's last vaginal delivery, she desires now right  section.  We will schedule at 39 weeks.  She is considering delivering vaginally if she delivers before 37-/2  weeks.  Boy!         Relevant Orders    POC Urinalysis Dipstick (Completed)          1. Pregnancy at 37w6d  2. Fetal status reassuring.   3. Activity and Exercise discussed.  Return in about 3 weeks (around 2021) for 2 wk PP- TH.   Per Dr. Rader she does not need to follow-up for an OB appointment next week prior to her primary  section.  Information on appointment dates and times were given.  Labor precautions and kick counts were reviewed    Maren Ventura, APRN  2021

## 2021-03-16 ENCOUNTER — APPOINTMENT (OUTPATIENT)
Dept: PREADMISSION TESTING | Facility: HOSPITAL | Age: 26
End: 2021-03-16

## 2021-03-16 VITALS — BODY MASS INDEX: 23.41 KG/M2 | HEIGHT: 62 IN | WEIGHT: 127.2 LBS

## 2021-03-16 DIAGNOSIS — Z3A.39 39 WEEKS GESTATION OF PREGNANCY: ICD-10-CM

## 2021-03-16 LAB
ABO GROUP BLD: NORMAL
BLD GP AB SCN SERPL QL: NEGATIVE
DEPRECATED RDW RBC AUTO: 38.8 FL (ref 37–54)
ERYTHROCYTE [DISTWIDTH] IN BLOOD BY AUTOMATED COUNT: 12.5 % (ref 12.3–15.4)
HCT VFR BLD AUTO: 34.7 % (ref 34–46.6)
HGB BLD-MCNC: 11 G/DL (ref 12–15.9)
MCH RBC QN AUTO: 27.2 PG (ref 26.6–33)
MCHC RBC AUTO-ENTMCNC: 31.7 G/DL (ref 31.5–35.7)
MCV RBC AUTO: 85.7 FL (ref 79–97)
PLATELET # BLD AUTO: 225 10*3/MM3 (ref 140–450)
PMV BLD AUTO: 12.1 FL (ref 6–12)
RBC # BLD AUTO: 4.05 10*6/MM3 (ref 3.77–5.28)
RH BLD: POSITIVE
T&S EXPIRATION DATE: NORMAL
WBC # BLD AUTO: 11.25 10*3/MM3 (ref 3.4–10.8)

## 2021-03-16 PROCEDURE — U0004 COV-19 TEST NON-CDC HGH THRU: HCPCS

## 2021-03-16 PROCEDURE — 86850 RBC ANTIBODY SCREEN: CPT

## 2021-03-16 PROCEDURE — 86901 BLOOD TYPING SEROLOGIC RH(D): CPT

## 2021-03-16 PROCEDURE — 86900 BLOOD TYPING SEROLOGIC ABO: CPT

## 2021-03-16 PROCEDURE — C9803 HOPD COVID-19 SPEC COLLECT: HCPCS

## 2021-03-16 PROCEDURE — 85027 COMPLETE CBC AUTOMATED: CPT

## 2021-03-16 PROCEDURE — 36415 COLL VENOUS BLD VENIPUNCTURE: CPT

## 2021-03-16 NOTE — DISCHARGE INSTRUCTIONS
What to know before your arrive:     -Do not eat, drink or chew gum beginning 8 hours before your scheduled arrival time to the hospital.  Except please drink 20 ounches of Gatorade and complete two hours before your  Given arrival time to the hospital.  If you drink too close to surgery time, your sugery may  Be delayed or cancelled.  Please complete as instructed.    -Do not shave any part of your body including abdomen or pelvic are for two    days before your procedure.   -If you are taking a scheduled medication (insulin, blood pressure medicine,   antibiotics) please consult with your physician whether to take on the day of   surgery.   -Remove all jewelry including rings, wedding bands, and piercing before coming   to the hospital.   -Leave important valuables at home.   -Do not wear dark fingernail polish.   -Please take two Tylenol 500 mg tablets the evening before surgery.   -Bring the following with you to the hospital:    -Picture ID and insurance, Medicare or Medicaid cards    -Co-pay/deductible required by insurance (Cash, Check, Credit Card)    -Copy of living will or power  document (if applicable)    -CPAP mask and tubing, not machine (if applicable)    -Skin prep instructions sheet    What to know the day of procedure:     -Park in the [facility], take elevator for first floor, exit to the right and  proceed through the doors to outside, follow the covered sidewalk to the  entrance of the [place], follow the hallway and signs to the [place],  enter the [place] to your right BEFORE entering the [address] lobby.  Take the  elevators to the 3rd floor ([address] [place]).   -Leave unnecessary items in your vehicle, including your suitcase.  Your support  person or a family member can get it for you after your procedure.   -Check in at the reception desk in the lobby of the 3rd floor ([address] [place]).   -One person may accompany you to the pre-op/recovery area.  Please have  other family  members wait in the waiting room.   -An anesthesiologist will meet with your prior to your procedure.   -After anesthesia has been initiated, one person may accompany you in the  operating room.   -No video cameras are permitted in the operating room; only still cameras,  Please.      What to expect while you are in recovery:     -One person may stay with you while you are in recovery.   -If the baby is stable, he/she may visit to initiate breastfeeding & [facility].    THE FOLLOWING INFORMATION WAS PROVIDED TO PATIENT:     SECTION BOOKLET BY CHEYENNE  PAIN MANAGEMENT   RESPIREX    CHLORHEXIDINE GLUCONATE WIPES AND INSTRUCTIONS GIVEN TO PATIENT

## 2021-03-16 NOTE — PAT
Patient to apply Chlorhexadine wipes  to surgical area (as instructed) the night before procedure and the AM of procedure. Wipes provided.    Patient instructed to drink 20 ounces (or until full) of Gatorade and it needs to be completed 2 hour before given arrival time for procedure (NO RED Gatorade)    Patient verbalized understanding.    Blood bank bracelet applied to patient during Pre Admission Testing visit.  Patient instructed not to remove from arm until after procedure and they are discharged from the hospital.  Explained to patient that they may shower and get the bracelet wet, but not to immerse under water for longer periods (bathing, swimming, hand dishwashing, etc).  Patient verbalized understanding.

## 2021-03-17 LAB — SARS-COV-2 RNA NOSE QL NAA+PROBE: NOT DETECTED

## 2021-03-19 ENCOUNTER — ANESTHESIA (OUTPATIENT)
Dept: LABOR AND DELIVERY | Facility: HOSPITAL | Age: 26
End: 2021-03-19

## 2021-03-19 ENCOUNTER — ANESTHESIA EVENT (OUTPATIENT)
Dept: LABOR AND DELIVERY | Facility: HOSPITAL | Age: 26
End: 2021-03-19

## 2021-03-19 ENCOUNTER — HOSPITAL ENCOUNTER (INPATIENT)
Facility: HOSPITAL | Age: 26
LOS: 2 days | Discharge: HOME | End: 2021-03-21
Attending: OBSTETRICS & GYNECOLOGY | Admitting: OBSTETRICS & GYNECOLOGY

## 2021-03-19 DIAGNOSIS — Z3A.39 39 WEEKS GESTATION OF PREGNANCY: ICD-10-CM

## 2021-03-19 DIAGNOSIS — Z98.891 STATUS POST CESAREAN SECTION: Primary | ICD-10-CM

## 2021-03-19 LAB
AMPHET+METHAMPHET UR QL: NEGATIVE
AMPHETAMINES UR QL: NEGATIVE
BARBITURATES UR QL SCN: NEGATIVE
BENZODIAZ UR QL SCN: NEGATIVE
BUPRENORPHINE SERPL-MCNC: NEGATIVE NG/ML
CANNABINOIDS SERPL QL: NEGATIVE
COCAINE UR QL: NEGATIVE
METHADONE UR QL SCN: NEGATIVE
OPIATES UR QL: NEGATIVE
OXYCODONE UR QL SCN: NEGATIVE
PCP UR QL SCN: NEGATIVE
PROPOXYPH UR QL: NEGATIVE
TRICYCLICS UR QL SCN: NEGATIVE

## 2021-03-19 PROCEDURE — C1765 ADHESION BARRIER: HCPCS | Performed by: OBSTETRICS & GYNECOLOGY

## 2021-03-19 PROCEDURE — 25010000002 ONDANSETRON PER 1 MG: Performed by: NURSE ANESTHETIST, CERTIFIED REGISTERED

## 2021-03-19 PROCEDURE — 25010000003 CEFAZOLIN IN DEXTROSE 2-4 GM/100ML-% SOLUTION: Performed by: OBSTETRICS & GYNECOLOGY

## 2021-03-19 PROCEDURE — 25010000002 ONDANSETRON PER 1 MG: Performed by: OBSTETRICS & GYNECOLOGY

## 2021-03-19 PROCEDURE — 59510 CESAREAN DELIVERY: CPT | Performed by: OBSTETRICS & GYNECOLOGY

## 2021-03-19 PROCEDURE — 25010000002 FENTANYL CITRATE (PF) 100 MCG/2ML SOLUTION: Performed by: NURSE ANESTHETIST, CERTIFIED REGISTERED

## 2021-03-19 PROCEDURE — 25010000002 KETOROLAC TROMETHAMINE PER 15 MG: Performed by: OBSTETRICS & GYNECOLOGY

## 2021-03-19 PROCEDURE — 80306 DRUG TEST PRSMV INSTRMNT: CPT | Performed by: OBSTETRICS & GYNECOLOGY

## 2021-03-19 PROCEDURE — 59025 FETAL NON-STRESS TEST: CPT

## 2021-03-19 PROCEDURE — 25010000002 METHYLERGONOVINE MALEATE PER 0.2 MG: Performed by: NURSE ANESTHETIST, CERTIFIED REGISTERED

## 2021-03-19 PROCEDURE — 25010000002 MIDAZOLAM PER 1 MG: Performed by: NURSE ANESTHETIST, CERTIFIED REGISTERED

## 2021-03-19 PROCEDURE — 25010000003 MORPHINE PER 10 MG: Performed by: NURSE ANESTHETIST, CERTIFIED REGISTERED

## 2021-03-19 PROCEDURE — 63710000001 PROMETHAZINE PER 12.5 MG: Performed by: NURSE PRACTITIONER

## 2021-03-19 DEVICE — ABSORBABLE ADHESION BARRIER
Type: IMPLANTABLE DEVICE | Status: FUNCTIONAL
Brand: GYNECARE INTERCEED

## 2021-03-19 RX ORDER — TRISODIUM CITRATE DIHYDRATE AND CITRIC ACID MONOHYDRATE 500; 334 MG/5ML; MG/5ML
30 SOLUTION ORAL ONCE
Status: DISCONTINUED | OUTPATIENT
Start: 2021-03-19 | End: 2021-03-21 | Stop reason: HOSPADM

## 2021-03-19 RX ORDER — ONDANSETRON 2 MG/ML
4 INJECTION INTRAMUSCULAR; INTRAVENOUS EVERY 6 HOURS PRN
Status: DISCONTINUED | OUTPATIENT
Start: 2021-03-19 | End: 2021-03-21 | Stop reason: HOSPADM

## 2021-03-19 RX ORDER — MORPHINE SULFATE 0.5 MG/ML
INJECTION, SOLUTION EPIDURAL; INTRATHECAL; INTRAVENOUS AS NEEDED
Status: DISCONTINUED | OUTPATIENT
Start: 2021-03-19 | End: 2021-03-19 | Stop reason: SURG

## 2021-03-19 RX ORDER — SODIUM CHLORIDE 0.9 % (FLUSH) 0.9 %
3 SYRINGE (ML) INJECTION EVERY 12 HOURS SCHEDULED
Status: DISCONTINUED | OUTPATIENT
Start: 2021-03-19 | End: 2021-03-19 | Stop reason: HOSPADM

## 2021-03-19 RX ORDER — ONDANSETRON 4 MG/1
4 TABLET, FILM COATED ORAL EVERY 6 HOURS PRN
Status: DISCONTINUED | OUTPATIENT
Start: 2021-03-19 | End: 2021-03-19 | Stop reason: HOSPADM

## 2021-03-19 RX ORDER — EPHEDRINE SULFATE 50 MG/ML
INJECTION, SOLUTION INTRAVENOUS AS NEEDED
Status: DISCONTINUED | OUTPATIENT
Start: 2021-03-19 | End: 2021-03-19 | Stop reason: SURG

## 2021-03-19 RX ORDER — DIPHENHYDRAMINE HCL 25 MG
25 CAPSULE ORAL EVERY 4 HOURS PRN
Status: DISCONTINUED | OUTPATIENT
Start: 2021-03-19 | End: 2021-03-21 | Stop reason: HOSPADM

## 2021-03-19 RX ORDER — IBUPROFEN 600 MG/1
600 TABLET ORAL EVERY 6 HOURS
Status: DISCONTINUED | OUTPATIENT
Start: 2021-03-20 | End: 2021-03-21 | Stop reason: HOSPADM

## 2021-03-19 RX ORDER — FENTANYL CITRATE 50 UG/ML
INJECTION, SOLUTION INTRAMUSCULAR; INTRAVENOUS AS NEEDED
Status: DISCONTINUED | OUTPATIENT
Start: 2021-03-19 | End: 2021-03-19 | Stop reason: SURG

## 2021-03-19 RX ORDER — FAMOTIDINE 10 MG/ML
INJECTION, SOLUTION INTRAVENOUS AS NEEDED
Status: DISCONTINUED | OUTPATIENT
Start: 2021-03-19 | End: 2021-03-19 | Stop reason: SURG

## 2021-03-19 RX ORDER — SODIUM CHLORIDE, SODIUM LACTATE, POTASSIUM CHLORIDE, CALCIUM CHLORIDE 600; 310; 30; 20 MG/100ML; MG/100ML; MG/100ML; MG/100ML
125 INJECTION, SOLUTION INTRAVENOUS CONTINUOUS
Status: DISCONTINUED | OUTPATIENT
Start: 2021-03-19 | End: 2021-03-21 | Stop reason: HOSPADM

## 2021-03-19 RX ORDER — OXYTOCIN-SODIUM CHLORIDE 0.9% IV SOLN 30 UNIT/500ML 30-0.9/5 UT/ML-%
SOLUTION INTRAVENOUS AS NEEDED
Status: DISCONTINUED | OUTPATIENT
Start: 2021-03-19 | End: 2021-03-19 | Stop reason: SURG

## 2021-03-19 RX ORDER — HYDROMORPHONE HYDROCHLORIDE 1 MG/ML
0.5 INJECTION, SOLUTION INTRAMUSCULAR; INTRAVENOUS; SUBCUTANEOUS
Status: DISCONTINUED | OUTPATIENT
Start: 2021-03-19 | End: 2021-03-19 | Stop reason: HOSPADM

## 2021-03-19 RX ORDER — PROMETHAZINE HYDROCHLORIDE 12.5 MG/1
12.5 SUPPOSITORY RECTAL EVERY 6 HOURS PRN
Status: DISCONTINUED | OUTPATIENT
Start: 2021-03-19 | End: 2021-03-21 | Stop reason: HOSPADM

## 2021-03-19 RX ORDER — ONDANSETRON 2 MG/ML
INJECTION INTRAMUSCULAR; INTRAVENOUS AS NEEDED
Status: DISCONTINUED | OUTPATIENT
Start: 2021-03-19 | End: 2021-03-19 | Stop reason: SURG

## 2021-03-19 RX ORDER — OXYCODONE HYDROCHLORIDE 5 MG/1
5 TABLET ORAL EVERY 6 HOURS PRN
Status: DISCONTINUED | OUTPATIENT
Start: 2021-03-19 | End: 2021-03-21 | Stop reason: HOSPADM

## 2021-03-19 RX ORDER — BUPIVACAINE HYDROCHLORIDE 7.5 MG/ML
INJECTION, SOLUTION INTRASPINAL AS NEEDED
Status: DISCONTINUED | OUTPATIENT
Start: 2021-03-19 | End: 2021-03-19 | Stop reason: SURG

## 2021-03-19 RX ORDER — CEFAZOLIN SODIUM 2 G/100ML
2 INJECTION, SOLUTION INTRAVENOUS ONCE
Status: COMPLETED | OUTPATIENT
Start: 2021-03-19 | End: 2021-03-19

## 2021-03-19 RX ORDER — ACETAMINOPHEN 500 MG
1000 TABLET ORAL EVERY 6 HOURS
Status: COMPLETED | OUTPATIENT
Start: 2021-03-19 | End: 2021-03-20

## 2021-03-19 RX ORDER — ACETAMINOPHEN 500 MG
1000 TABLET ORAL ONCE
Status: COMPLETED | OUTPATIENT
Start: 2021-03-19 | End: 2021-03-19

## 2021-03-19 RX ORDER — SODIUM CHLORIDE 0.9 % (FLUSH) 0.9 %
1-10 SYRINGE (ML) INJECTION AS NEEDED
Status: DISCONTINUED | OUTPATIENT
Start: 2021-03-19 | End: 2021-03-19 | Stop reason: HOSPADM

## 2021-03-19 RX ORDER — OXYCODONE HYDROCHLORIDE 5 MG/1
10 TABLET ORAL EVERY 6 HOURS PRN
Status: DISCONTINUED | OUTPATIENT
Start: 2021-03-19 | End: 2021-03-21 | Stop reason: HOSPADM

## 2021-03-19 RX ORDER — OXYTOCIN-SODIUM CHLORIDE 0.9% IV SOLN 30 UNIT/500ML 30-0.9/5 UT/ML-%
85 SOLUTION INTRAVENOUS ONCE
Status: DISCONTINUED | OUTPATIENT
Start: 2021-03-19 | End: 2021-03-19 | Stop reason: HOSPADM

## 2021-03-19 RX ORDER — CARBOPROST TROMETHAMINE 250 UG/ML
250 INJECTION, SOLUTION INTRAMUSCULAR AS NEEDED
Status: DISCONTINUED | OUTPATIENT
Start: 2021-03-19 | End: 2021-03-19 | Stop reason: HOSPADM

## 2021-03-19 RX ORDER — ONDANSETRON 2 MG/ML
4 INJECTION INTRAMUSCULAR; INTRAVENOUS ONCE AS NEEDED
Status: DISCONTINUED | OUTPATIENT
Start: 2021-03-19 | End: 2021-03-21 | Stop reason: HOSPADM

## 2021-03-19 RX ORDER — LIDOCAINE HYDROCHLORIDE 10 MG/ML
5 INJECTION, SOLUTION EPIDURAL; INFILTRATION; INTRACAUDAL; PERINEURAL AS NEEDED
Status: DISCONTINUED | OUTPATIENT
Start: 2021-03-19 | End: 2021-03-19 | Stop reason: HOSPADM

## 2021-03-19 RX ORDER — FENTANYL CITRATE 50 UG/ML
50 INJECTION, SOLUTION INTRAMUSCULAR; INTRAVENOUS
Status: DISCONTINUED | OUTPATIENT
Start: 2021-03-19 | End: 2021-03-21 | Stop reason: HOSPADM

## 2021-03-19 RX ORDER — MISOPROSTOL 200 UG/1
800 TABLET ORAL AS NEEDED
Status: DISCONTINUED | OUTPATIENT
Start: 2021-03-19 | End: 2021-03-19 | Stop reason: HOSPADM

## 2021-03-19 RX ORDER — PROMETHAZINE HYDROCHLORIDE 12.5 MG/1
12.5 TABLET ORAL EVERY 6 HOURS PRN
Status: DISCONTINUED | OUTPATIENT
Start: 2021-03-19 | End: 2021-03-21 | Stop reason: HOSPADM

## 2021-03-19 RX ORDER — TRISODIUM CITRATE DIHYDRATE AND CITRIC ACID MONOHYDRATE 500; 334 MG/5ML; MG/5ML
30 SOLUTION ORAL ONCE
Status: COMPLETED | OUTPATIENT
Start: 2021-03-19 | End: 2021-03-19

## 2021-03-19 RX ORDER — METHYLERGONOVINE MALEATE 0.2 MG/ML
INJECTION INTRAVENOUS AS NEEDED
Status: DISCONTINUED | OUTPATIENT
Start: 2021-03-19 | End: 2021-03-19 | Stop reason: SURG

## 2021-03-19 RX ORDER — SIMETHICONE 80 MG
80 TABLET,CHEWABLE ORAL 4 TIMES DAILY PRN
Status: DISCONTINUED | OUTPATIENT
Start: 2021-03-19 | End: 2021-03-21 | Stop reason: HOSPADM

## 2021-03-19 RX ORDER — METHYLERGONOVINE MALEATE 0.2 MG/ML
200 INJECTION INTRAVENOUS AS NEEDED
Status: DISCONTINUED | OUTPATIENT
Start: 2021-03-19 | End: 2021-03-19 | Stop reason: HOSPADM

## 2021-03-19 RX ORDER — KETOROLAC TROMETHAMINE 30 MG/ML
30 INJECTION, SOLUTION INTRAMUSCULAR; INTRAVENOUS ONCE
Status: COMPLETED | OUTPATIENT
Start: 2021-03-19 | End: 2021-03-19

## 2021-03-19 RX ORDER — ACETAMINOPHEN 325 MG/1
650 TABLET ORAL EVERY 6 HOURS
Status: DISCONTINUED | OUTPATIENT
Start: 2021-03-20 | End: 2021-03-21 | Stop reason: HOSPADM

## 2021-03-19 RX ORDER — MIDAZOLAM HYDROCHLORIDE 1 MG/ML
INJECTION INTRAMUSCULAR; INTRAVENOUS AS NEEDED
Status: DISCONTINUED | OUTPATIENT
Start: 2021-03-19 | End: 2021-03-19 | Stop reason: SURG

## 2021-03-19 RX ORDER — ONDANSETRON 2 MG/ML
4 INJECTION INTRAMUSCULAR; INTRAVENOUS EVERY 6 HOURS PRN
Status: DISCONTINUED | OUTPATIENT
Start: 2021-03-19 | End: 2021-03-19 | Stop reason: HOSPADM

## 2021-03-19 RX ORDER — OXYTOCIN-SODIUM CHLORIDE 0.9% IV SOLN 30 UNIT/500ML 30-0.9/5 UT/ML-%
650 SOLUTION INTRAVENOUS ONCE
Status: DISCONTINUED | OUTPATIENT
Start: 2021-03-19 | End: 2021-03-19 | Stop reason: HOSPADM

## 2021-03-19 RX ORDER — KETOROLAC TROMETHAMINE 15 MG/ML
15 INJECTION, SOLUTION INTRAMUSCULAR; INTRAVENOUS EVERY 6 HOURS
Status: COMPLETED | OUTPATIENT
Start: 2021-03-19 | End: 2021-03-20

## 2021-03-19 RX ORDER — DIPHENHYDRAMINE HYDROCHLORIDE 50 MG/ML
25 INJECTION INTRAMUSCULAR; INTRAVENOUS EVERY 4 HOURS PRN
Status: DISCONTINUED | OUTPATIENT
Start: 2021-03-19 | End: 2021-03-21 | Stop reason: HOSPADM

## 2021-03-19 RX ORDER — METHYLERGONOVINE MALEATE 0.2 MG/ML
200 INJECTION INTRAVENOUS ONCE AS NEEDED
Status: DISCONTINUED | OUTPATIENT
Start: 2021-03-19 | End: 2021-03-21 | Stop reason: HOSPADM

## 2021-03-19 RX ORDER — NALOXONE HCL 0.4 MG/ML
0.4 VIAL (ML) INJECTION
Status: ACTIVE | OUTPATIENT
Start: 2021-03-19 | End: 2021-03-20

## 2021-03-19 RX ORDER — DOCUSATE SODIUM 100 MG/1
100 CAPSULE, LIQUID FILLED ORAL 2 TIMES DAILY
Status: DISCONTINUED | OUTPATIENT
Start: 2021-03-19 | End: 2021-03-21 | Stop reason: HOSPADM

## 2021-03-19 RX ADMIN — SODIUM CITRATE AND CITRIC ACID MONOHYDRATE 30 ML: 500; 334 SOLUTION ORAL at 09:04

## 2021-03-19 RX ADMIN — ONDANSETRON 4 MG: 2 INJECTION INTRAMUSCULAR; INTRAVENOUS at 11:19

## 2021-03-19 RX ADMIN — ONDANSETRON 4 MG: 2 INJECTION INTRAMUSCULAR; INTRAVENOUS at 09:07

## 2021-03-19 RX ADMIN — METHYLERGONOVINE MALEATE 200 MCG: 0.2 INJECTION INTRAVENOUS at 09:34

## 2021-03-19 RX ADMIN — FENTANYL CITRATE 20 MCG: 50 INJECTION, SOLUTION INTRAMUSCULAR; INTRAVENOUS at 09:12

## 2021-03-19 RX ADMIN — DOCUSATE SODIUM 100 MG: 100 CAPSULE, LIQUID FILLED ORAL at 20:52

## 2021-03-19 RX ADMIN — SIMETHICONE 80 MG: 80 TABLET, CHEWABLE ORAL at 18:18

## 2021-03-19 RX ADMIN — MIDAZOLAM 1 MG: 1 INJECTION INTRAMUSCULAR; INTRAVENOUS at 09:41

## 2021-03-19 RX ADMIN — SODIUM CHLORIDE, POTASSIUM CHLORIDE, SODIUM LACTATE AND CALCIUM CHLORIDE 1000 ML/HR: 600; 310; 30; 20 INJECTION, SOLUTION INTRAVENOUS at 08:43

## 2021-03-19 RX ADMIN — EPHEDRINE SULFATE 10 MG: 50 INJECTION, SOLUTION INTRAVENOUS at 09:17

## 2021-03-19 RX ADMIN — SODIUM CHLORIDE, POTASSIUM CHLORIDE, SODIUM LACTATE AND CALCIUM CHLORIDE: 600; 310; 30; 20 INJECTION, SOLUTION INTRAVENOUS at 09:18

## 2021-03-19 RX ADMIN — MORPHINE SULFATE 0.15 MG: 0.5 INJECTION, SOLUTION EPIDURAL; INTRATHECAL; INTRAVENOUS at 09:12

## 2021-03-19 RX ADMIN — OXYTOCIN 500 ML: 10 INJECTION INTRAVENOUS at 09:30

## 2021-03-19 RX ADMIN — ACETAMINOPHEN 1000 MG: 500 TABLET ORAL at 20:52

## 2021-03-19 RX ADMIN — SODIUM CHLORIDE, POTASSIUM CHLORIDE, SODIUM LACTATE AND CALCIUM CHLORIDE 1000 ML: 600; 310; 30; 20 INJECTION, SOLUTION INTRAVENOUS at 08:05

## 2021-03-19 RX ADMIN — KETOROLAC TROMETHAMINE 30 MG: 30 INJECTION, SOLUTION INTRAMUSCULAR at 11:19

## 2021-03-19 RX ADMIN — KETOROLAC TROMETHAMINE 15 MG: 15 INJECTION, SOLUTION INTRAMUSCULAR; INTRAVENOUS at 17:31

## 2021-03-19 RX ADMIN — ACETAMINOPHEN 1000 MG: 500 TABLET ORAL at 08:28

## 2021-03-19 RX ADMIN — SODIUM CHLORIDE, POTASSIUM CHLORIDE, SODIUM LACTATE AND CALCIUM CHLORIDE 125 ML/HR: 600; 310; 30; 20 INJECTION, SOLUTION INTRAVENOUS at 13:19

## 2021-03-19 RX ADMIN — MIDAZOLAM 1 MG: 1 INJECTION INTRAMUSCULAR; INTRAVENOUS at 09:07

## 2021-03-19 RX ADMIN — CEFAZOLIN SODIUM 2 G: 2 INJECTION, SOLUTION INTRAVENOUS at 08:59

## 2021-03-19 RX ADMIN — PROMETHAZINE HYDROCHLORIDE 12.5 MG: 12.5 TABLET ORAL at 13:19

## 2021-03-19 RX ADMIN — ACETAMINOPHEN 1000 MG: 500 TABLET ORAL at 14:16

## 2021-03-19 RX ADMIN — FAMOTIDINE 20 MG: 10 INJECTION, SOLUTION INTRAVENOUS at 09:07

## 2021-03-19 RX ADMIN — BUPIVACAINE HYDROCHLORIDE IN DEXTROSE 1.3 ML: 7.5 INJECTION, SOLUTION SUBARACHNOID at 09:12

## 2021-03-19 RX ADMIN — OXYTOCIN 500 ML: 10 INJECTION INTRAVENOUS at 09:57

## 2021-03-19 NOTE — ANESTHESIA PROCEDURE NOTES
Spinal Block      Patient reassessed immediately prior to procedure    Indication:procedure for pain  Performed By  CRNA: Tasia Fritz CRNA  Preanesthetic Checklist  Completed: patient identified, IV checked, risks and benefits discussed, surgical consent, monitors and equipment checked, pre-op evaluation and timeout performed  Spinal Block Prep:  Patient Position:sitting  Sterile Tech:cap, gloves, mask and sterile barriers  Prep:Betadine  Patient Monitoring:blood pressure monitoring, continuous pulse oximetry and EKG  Spinal Block Procedure  Approach:midline  Guidance:palpation technique  Location:L3-L4  Needle Type:Suzie  Needle Gauge:25 G  Placement of Spinal needle event:cerebrospinal fluid aspirated  Paresthesia: no  Fluid Appearance:clear     Post Assessment  Patient Tolerance:patient tolerated the procedure well with no apparent complications  Complications no

## 2021-03-19 NOTE — H&P
History and Physical:    Subjective     Chief Complaint   Patient presents with   • Scheduled        Nancy Little is a 26 y.o. year old  with an Estimated Date of Delivery: 3/26/21 currently at 39w0d presenting with Scheduled  section.  Patient had urinary retention and tailbone pain following her vaginal delivery.  This is been a chronic problem.  Patient elected for out right  section for this delivery..    Prenatal care has been with Cheyanne Vasquez MD.  It has been significant for Tailbone pain following vaginal delivery..        Review of Systems  Pertinent items are noted in HPI.     Past Medical History:   Diagnosis Date   • Acid reflux     hx nexium   • Eczema    • History of pre-eclampsia    • Preeclampsia    • Tachycardia     after first delivery    • Urinary retention      Past Surgical History:   Procedure Laterality Date   • CHEILECTOMY Right 2016    Procedure: RT HALLUX RIGIDUS REPAIR CHONDROPLASTY QIAN OSTEOTOMY ;  Surgeon: Bob Grant MD;  Location: [facility] [place] [facility] [place]  Service:    • WISDOM TOOTH EXTRACTION       Family History   Problem Relation Age of Onset   • Leukemia Father    • Breast cancer Mother    • No Known Problems Sister    • Diabetes Maternal Grandfather      Social History     Tobacco Use   • Smoking status: Never Smoker   • Smokeless tobacco: Never Used   Vaping Use   • Vaping Use: Never used   Substance Use Topics   • Alcohol use: No     Comment: Per Carlene, never   • Drug use: No     Medications Prior to Admission   Medication Sig Dispense Refill Last Dose   • pantoprazole (Protonix) 20 MG EC tablet Take 1 tablet by mouth Daily. 30 tablet 6 3/19/2021 at Unknown time   • Prenatal Vit-Fe Fumarate-FA (PRENATAL 27-) 27-1 MG tablet tablet Take 1 tablet by mouth Daily.   3/18/2021 at Unknown time   • Misc. Devices (Breast Pump) misc Use PRN 1 each 0      Allergies:  Penicillins  OB History    Para Term  AB Living   2 1 1  0 0 1   SAB TAB Ectopic Molar Multiple Live Births   0 0 0 0 0 1      # Outcome Date GA Lbr John/2nd Weight Sex Delivery Anes PTL Lv   2 Current            1 Term 05/28/19 38w5d 04:00 / 00:29 2655 g (5 lb 13.7 oz) F Vag-Vacuum EPI N MAE         Objective     /86 (BP Location: Right arm, Patient Position: Lying)   Pulse 77   Temp 98.1 °F (36.7 °C) (Oral)   Resp 16   LMP 06/25/2020   SpO2 97%     Physical Exam    General:  No acute distress           Abdomen: Gravid, nontender       FHT's: reactive    Cervix:  Deferred   [place]: Contraction are none     Lab Review   External Prenatal Results     Pregnancy Outside Results - Transcribed From Office Records - See Scanned Records For Details     Test Value Date Time    Hgb  11.0 g/dL 03/16/21 1557       11.4 g/dL 01/05/21 1116    Hct  34.7 % 03/16/21 1557       34.3 % 01/05/21 1116    ABO  A  03/16/21 1557    Rh  Positive  03/16/21 1557    Antibody Screen  Negative  03/16/21 1557       Negative  01/05/21 1116    Glucose Fasting GTT       Glucose Tolerance Test 1 hour ^ 85  03/18/19     Glucose Tolerance Test 3 hour       Gonorrhea (discrete) ^ Negative  10/22/18     Chlamydia (discrete) ^ Negative  10/22/18     RPR ^ Non-Reactive  10/22/18     VDRL       Syphilis Antibody       Rubella ^ Immune  10/22/18     HBsAg ^ Negative  10/22/18     Herpes Simplex Virus PCR       Herpes Simplex VIrus Culture       HIV ^ Non-Reactive  10/22/18     Hep C RNA Quant PCR       Hep C Antibody ^ negative  10/22/18     AFP       Group B Strep  No Group B Streptococcus isolated  03/03/21 1045    GBS Susceptibility to Clindamycin       GBS Susceptibility to Erythromycin       Fetal Fibronectin       Genetic Testing, Maternal Blood             Drug Screening     Test Value Date Time    Urine Drug Screen       Amphetamine Screen  Negative  05/28/19 1619    Barbiturate Screen  Negative  05/28/19 1619    Benzodiazepine Screen  Negative  05/28/19 1619    Methadone Screen  Negative   19 1619    Phencyclidine Screen  Negative  19 1619    Opiates Screen  Negative  19 1619    THC Screen  Negative  19 1619    Cocaine Screen       Propoxyphene Screen  Negative  19 1619    Buprenorphine Screen  Negative  19 1619    Methamphetamine Screen       Oxycodone Screen  Negative  19 1619    Tricyclic Antidepressants Screen  Negative  19 1619          Legend    ^: Historical                        Patient Active Problem List    Diagnosis Date Noted   • Pregnancy 2020     Note Last Updated: 3/3/2021     Vacuum assisted-pushed 20 minutes.  Had urinary retention requiring catheterization for a week after delivery.  Tailbone pain since delivery 5#14oz. ultrasound at 36+5 weeks estimates weight and 5 pounds 12 ounces.  No significant injury following patient's last vaginal delivery, she desires now right  section.  We will schedule at 39 weeks.  She is considering delivering vaginally if she delivers before 37-1/2 weeks.  Boy!     • Hx of preeclampsia, prior pregnancy, currently pregnant 2020     Note Last Updated: 10/15/2020     On baby aspirin, creatinine noted to be 0.98 at first visit.  Repeat at 12 weeks was 0.67.  Baseline 24-hour urine was 141.          Assessment/Plan     ASSESSMENT  1. IUP at 39w0d  2. scheduled  section   3. GBBSnegative    PLAN  1. Admit to labor and delivery   2.  proceed with c section         Cheyanne Vasquez MD  3/19/2021@

## 2021-03-19 NOTE — OP NOTE
[facility]   Section Operative Note    Pre-Operative Dx:   1.  IUP at 39w0d  weeks     2. Elective - patient's request        Postoperative dx:    1.  Same     Procedure: Procedure(s):   SECTION PRIMARY   Surgeon: Cheyanne Vasquez MD    Assistant: Surgeon(s):  Cheyanne Vasquez MD  Assistant: Venita Toth PCT     Anesthesia: Spinal    EBL:   mls.  480 mls.         IV Fluids:  1600 mls.   UOP:  50 mls.       Antibiotics: cefazolin (Ancef)     Infant:            Gender: male  infant    Weight: 3215 g (7 lb 1.4 oz)     Apgars: 9  @ 1 minute /     9  @ 5 minutes    Fetal presentation: cephalic   Amniotic fluid: Clear      Complications:   None      Disposition:   Mother to Mother Baby/Postpartum  in stable condition currently.   Baby to NBN  in stable condition currently.       Procedure:   The patient was taken to the operating room where spinal anesthesia was placed, and she was placed in supine position with a leftward tilt. Sequential compression devices on bilateral lower extremities and a Aguilar catheter placed in her bladder. After being prepped and draped in a normal sterile fashion, a Pfannenstiel skin incision was made with a scalpel and taken down to the level of the fascia using the Bovie. The fascia was incised in the midline and extended laterally. The superior edge of the fascia was grasped with Kocher clamps, elevated and the rectus muscle dissected off. In a similar fashion, the inferior edge of the fascia was grasped with Kocher clamps, elevated and the rectus muscles dissected off. The rectus muscles were bisected in the midline. The peritoneum was identified, grasped and entered into sharply using Metzenbaum scissors. This incision was extended superiorly and inferiorly with good visualization of the bladder. Bladder blade was placed. A bladder flap was created. A low transverse uterine incision was made with a scalpel and extended laterally. Amniotomy with clear  fluid occurred at the time of hysterotomy. The head was brought to the uterine incision, and using fundal pressure, the head delivered without complication. Nose and mouth were bulb suctioned.  Shoulders and body were to follow. Cord was milked x4, clamped x2 and cut. Infant was handed to the awaiting pediatric team.  Cord blood was obtained. The placenta was manually extracted. The uterus was exteriorized and cleared of all clot and debris and the hysterotomy site was closed with a 1-0 chromic in a running locked fashion starting at the left angle and moving towards the right. Copious irrigation behind the uterus ensued. The uterus was returned to the abdominal cavity. Paracolic gutters were cleared of all clot and debris. The hysterotomy site was noted to be hemostatic as well as the bladder flap and Interceed was placed over this. The peritoneum was reapproximated using a 2-0 chromic in a running fashion starting superiorly and moving inferiorly. Irrigation over the rectus muscles then occurred with Bovie cauterization of any bleeding sites. The fascia was reapproximated using 0 Vicryl in a running fashion starting at the left angle and moving towards the right.  The subcutaneous fat layer was hemostatic and closed in an interrupted fashion with 2-0 Plain.  The skin was reapproximated with a 4-0 vicryl on a Star needle. All sponge, lap, and needle counts were correct x2. The patient was taken to the recovery room in stable condition.     Assist:  Assistant: Venita Toth PCT  was responsible for performing the following activities: Retraction, Suction and Irrigation and their skilled assistance was necessary for the success of this case.    Cheyanne Vasquez MD  3/19/2021  10:16 EDT

## 2021-03-19 NOTE — PLAN OF CARE
Goal Outcome Evaluation:     Patient said baby nursed well after delivery.  She said she successfully breast fed her first child for 6 months and pumped for an additional 3 months.  She was advised to attempt breastfeeding every 3 hours and on demand, and to ask for assistance, if needed.  She said she has 2 home breast pumps.

## 2021-03-19 NOTE — ANESTHESIA PREPROCEDURE EVALUATION
Anesthesia Evaluation     Patient summary reviewed and Nursing notes reviewed   NPO Solid Status: > 8 hours  NPO Liquid Status: > 8 hours           Airway   Mallampati: I  TM distance: >3 FB  Neck ROM: full  No difficulty expected  Dental      Pulmonary - negative pulmonary ROS   Cardiovascular - negative cardio ROS        Neuro/Psych- negative ROS  GI/Hepatic/Renal/Endo    (+)  GERD,      Musculoskeletal (-) negative ROS    Abdominal    Substance History - negative use     OB/GYN    (+) Pregnant,         Other                      Anesthesia Plan    ASA 2     spinal and ITN       Anesthetic plan, all risks, benefits, and alternatives have been provided, discussed and informed consent has been obtained with: patient.

## 2021-03-19 NOTE — ANESTHESIA POSTPROCEDURE EVALUATION
Patient: Nancy Little    Procedure Summary     Date: 21 Room / Location: [facility] LABOR DELIVERY   [facility] LABOR DELIVERY    Anesthesia Start: 906 Anesthesia Stop:     Procedure:  SECTION PRIMARY (N/A Abdomen) Diagnosis:     Surgeons: Cheyanne Vasquez MD Provider: Angelo Singer DO    Anesthesia Type: spinal, ITN ASA Status: 2          Anesthesia Type: spinal, ITN    Vitals  Vitals Value Taken Time   /71 21 1005   Temp 97.5 °F (36.4 °C) 21 1005   Pulse 82 21 1007   Resp 16 21 1005   SpO2 98 % 21 1007   Vitals shown include unvalidated device data.        Post Anesthesia Care and Evaluation    Patient location during evaluation: bedside  Patient participation: complete - patient participated  Level of consciousness: awake and alert  Pain score: 0  Pain management: adequate  Airway patency: patent  Anesthetic complications: No anesthetic complications    Cardiovascular status: acceptable  Respiratory status: acceptable  Hydration status: acceptable

## 2021-03-20 LAB
BASOPHILS # BLD AUTO: 0.06 10*3/MM3 (ref 0–0.2)
BASOPHILS NFR BLD AUTO: 0.5 % (ref 0–1.5)
DEPRECATED RDW RBC AUTO: 41 FL (ref 37–54)
EOSINOPHIL # BLD AUTO: 0.2 10*3/MM3 (ref 0–0.4)
EOSINOPHIL NFR BLD AUTO: 1.6 % (ref 0.3–6.2)
ERYTHROCYTE [DISTWIDTH] IN BLOOD BY AUTOMATED COUNT: 12.9 % (ref 12.3–15.4)
HCT VFR BLD AUTO: 30.7 % (ref 34–46.6)
HGB BLD-MCNC: 9.5 G/DL (ref 12–15.9)
IMM GRANULOCYTES # BLD AUTO: 0.06 10*3/MM3 (ref 0–0.05)
IMM GRANULOCYTES NFR BLD AUTO: 0.5 % (ref 0–0.5)
LYMPHOCYTES # BLD AUTO: 1.79 10*3/MM3 (ref 0.7–3.1)
LYMPHOCYTES NFR BLD AUTO: 14.3 % (ref 19.6–45.3)
MCH RBC QN AUTO: 27.1 PG (ref 26.6–33)
MCHC RBC AUTO-ENTMCNC: 30.9 G/DL (ref 31.5–35.7)
MCV RBC AUTO: 87.7 FL (ref 79–97)
MONOCYTES # BLD AUTO: 0.55 10*3/MM3 (ref 0.1–0.9)
MONOCYTES NFR BLD AUTO: 4.4 % (ref 5–12)
NEUTROPHILS NFR BLD AUTO: 78.7 % (ref 42.7–76)
NEUTROPHILS NFR BLD AUTO: 9.83 10*3/MM3 (ref 1.7–7)
NRBC BLD AUTO-RTO: 0 /100 WBC (ref 0–0.2)
PLATELET # BLD AUTO: 232 10*3/MM3 (ref 140–450)
PMV BLD AUTO: 11.9 FL (ref 6–12)
RBC # BLD AUTO: 3.5 10*6/MM3 (ref 3.77–5.28)
WBC # BLD AUTO: 12.49 10*3/MM3 (ref 3.4–10.8)

## 2021-03-20 PROCEDURE — 25010000002 KETOROLAC TROMETHAMINE PER 15 MG: Performed by: OBSTETRICS & GYNECOLOGY

## 2021-03-20 PROCEDURE — 0503F POSTPARTUM CARE VISIT: CPT | Performed by: NURSE PRACTITIONER

## 2021-03-20 PROCEDURE — 85025 COMPLETE CBC W/AUTO DIFF WBC: CPT | Performed by: OBSTETRICS & GYNECOLOGY

## 2021-03-20 RX ADMIN — ACETAMINOPHEN 650 MG: 325 TABLET ORAL at 21:24

## 2021-03-20 RX ADMIN — ACETAMINOPHEN 1000 MG: 500 TABLET ORAL at 08:45

## 2021-03-20 RX ADMIN — OXYCODONE 5 MG: 5 TABLET ORAL at 17:39

## 2021-03-20 RX ADMIN — KETOROLAC TROMETHAMINE 15 MG: 15 INJECTION, SOLUTION INTRAMUSCULAR; INTRAVENOUS at 00:32

## 2021-03-20 RX ADMIN — ACETAMINOPHEN 1000 MG: 500 TABLET ORAL at 16:08

## 2021-03-20 RX ADMIN — KETOROLAC TROMETHAMINE 15 MG: 15 INJECTION, SOLUTION INTRAMUSCULAR; INTRAVENOUS at 06:08

## 2021-03-20 RX ADMIN — DOCUSATE SODIUM 100 MG: 100 CAPSULE, LIQUID FILLED ORAL at 21:24

## 2021-03-20 RX ADMIN — KETOROLAC TROMETHAMINE 15 MG: 15 INJECTION, SOLUTION INTRAMUSCULAR; INTRAVENOUS at 12:17

## 2021-03-20 RX ADMIN — IBUPROFEN 600 MG: 600 TABLET, FILM COATED ORAL at 17:39

## 2021-03-20 RX ADMIN — ACETAMINOPHEN 1000 MG: 500 TABLET ORAL at 03:46

## 2021-03-20 RX ADMIN — SIMETHICONE 80 MG: 80 TABLET, CHEWABLE ORAL at 08:46

## 2021-03-20 NOTE — PROGRESS NOTES
3/20/2021    Name:Nancy iLttle    MR#:[id number]     PROGRESS NOTE:  Post-Op 1 S/P    HD:1    Subjective   26 y.o. yo Female  s/p CS at 39w0d doing well. Pain well controlled. Tolerating regular diet and having flatus. Lochia normal.     Patient Active Problem List   Diagnosis   • Hx of preeclampsia, prior pregnancy, currently pregnant   • Pregnancy        Objective    Vitals  Temp:  Temp:  [97.3 °F (36.3 °C)-98.9 °F (37.2 °C)] 98.8 °F (37.1 °C)  Temp src: Oral  BP:  BP: (102-124)/(50-89) 102/67  Pulse:  Heart Rate:  [58-85] 70  RR:   Resp:  [16-20] 16    General Awake, alert, no distress  Abdomen Soft, non-distended, fundus firm, below umbilicus, appropriately tender  Incision  Intact, no erythema or exudate  Extremities Calves NT bilaterally     I/O last 3 completed shifts:  In: 1100 [I.V.:1100]  Out: 3330 [Urine:2850; Blood:480]    LABS:   Lab Results   Component Value Date    WBC 11.25 (H) 2021    HGB 11.0 (L) 2021    HCT 34.7 2021    MCV 85.7 2021     2021       Infant: male       Assessment   1.  POD 1, doing well; am labs pending   2.  Baby boy well;  Desires circ    Plan:   Routine postoperative care.  Advance      Active Problems:   None      Yanira Starks, APRN  3/20/2021 09:21 EDT

## 2021-03-21 VITALS
HEART RATE: 89 BPM | SYSTOLIC BLOOD PRESSURE: 110 MMHG | DIASTOLIC BLOOD PRESSURE: 75 MMHG | RESPIRATION RATE: 16 BRPM | OXYGEN SATURATION: 97 % | TEMPERATURE: 98.5 F

## 2021-03-21 PROBLEM — Z98.891 STATUS POST CESAREAN SECTION: Status: ACTIVE | Noted: 2021-03-21

## 2021-03-21 PROBLEM — Z34.90 PREGNANCY: Status: RESOLVED | Noted: 2020-09-17 | Resolved: 2021-03-21

## 2021-03-21 PROCEDURE — 0503F POSTPARTUM CARE VISIT: CPT | Performed by: OBSTETRICS & GYNECOLOGY

## 2021-03-21 RX ORDER — OXYCODONE HYDROCHLORIDE 5 MG/1
5 TABLET ORAL EVERY 6 HOURS PRN
Qty: 10 TABLET | Refills: 0 | Status: SHIPPED | OUTPATIENT
Start: 2021-03-21 | End: 2021-03-26

## 2021-03-21 RX ADMIN — ACETAMINOPHEN 650 MG: 325 TABLET ORAL at 10:11

## 2021-03-21 RX ADMIN — IBUPROFEN 600 MG: 600 TABLET, FILM COATED ORAL at 00:19

## 2021-03-21 RX ADMIN — IBUPROFEN 600 MG: 600 TABLET, FILM COATED ORAL at 06:21

## 2021-03-21 RX ADMIN — ACETAMINOPHEN 650 MG: 325 TABLET ORAL at 03:38

## 2021-03-21 RX ADMIN — DOCUSATE SODIUM 100 MG: 100 CAPSULE, LIQUID FILLED ORAL at 10:11

## 2021-03-21 NOTE — DISCHARGE SUMMARY
Discharge Summary    Date of Admission: 3/19/2021  Date of Discharge:  3/21/2021      Patient: Nancy Little      MR#:[id number]    Primary Surgeon/OB: Cheyanne Vasquez MD    Discharge Surgeon/OB: Marcy Degroot MD    Presenting Problem/History of Present Illness  Pregnancy [Z34.90]     Patient Active Problem List   Diagnosis   • Hx of preeclampsia, prior pregnancy, currently pregnant   • Status post  section         Discharge Diagnosis:  section at 39w0d    Procedures:  , Low Transverse     3/19/2021    9:29 AM        Discharge Date: 3/21/2021; Discharge Time: 11:13 EDT    Early Discharge:  NO    Hospital Course  Patient is a 26 y.o. female  at 39w0d status post  section with uneventful postoperative recovery.  Patient was advanced to regular diet on postoperative day#1.  On discharge, ambulating, tolerating a regular diet without any difficulties and her incision is dry, clean and intact.     Infant:   male  fetus 3215 g (7 lb 1.4 oz)  with Apgar scores of 9 , 9  at five minutes.    Condition on Discharge:  Stable    Vital Signs  Temp:  [98.2 °F (36.8 °C)-98.8 °F (37.1 °C)] 98.5 °F (36.9 °C)  Heart Rate:  [74-89] 89  Resp:  [16] 16  BP: (104-117)/(52-78) 110/75    Lab Results   Component Value Date    WBC 12.49 (H) 2021    HGB 9.5 (L) 2021    HCT 30.7 (L) 2021    MCV 87.7 2021     2021       Discharge Disposition  Home or Self Care    Discharge Medications     Discharge Medications      New Medications      Instructions Start Date   oxyCODONE 5 MG immediate release tablet  Commonly known as: ROXICODONE   5 mg, Oral, Every 6 Hours PRN         Continue These Medications      Instructions Start Date   Breast Pump misc   Use PRN      pantoprazole 20 MG EC tablet  Commonly known as: Protonix   20 mg, Oral, Daily      Prenatal 27-1 27-1 MG tablet tablet   1 tablet, Oral, Daily             Discharge Diet:     Activity at Discharge:    Activity Instructions     Activity as Tolerated      Other Instructions (Specify)      No driving for 2 weeks, No lifting over 10lbs for 6 weeks.    Pelvic Rest      Pelvic rest including no tampons, no tub baths, and no intercourse until 6 weeks/cleared by Provider.          Follow-up Appointments  Future Appointments   Date Time Provider Department Center   4/2/2021 10:15 AM Maren Ventura APRN [facility]  [facility]     Additional Instructions for the Follow-ups that You Need to Schedule     Call MD for problems / concerns.   As directed      Please call with concerns of depression.    Order Comments: Please call with concerns of depression.          Discharge Follow-up with Specialty: Cheyanne Vasquez; 2 Weeks   As directed      Specialty: Cheyanne Vasquez    Follow Up: 2 Weeks               Marcy Degroot MD  03/21/21  11:13 EDT  [facility]

## 2021-03-21 NOTE — ANESTHESIA POSTPROCEDURE EVALUATION
Patient: Nancy Little    Procedure Summary     Date: 21 Room / Location: [facility] LABOR DELIVERY   [facility]OR [facility]IV[facility]    Anesthesia Start: 906 Anesthesia Stop:     Procedure:  SECTION PRIMARY (N/A Abdomen) Diagnosis:     Surgeons: Cheyanne Vasqeuz MD Provider: Angelo Singer DO    Anesthesia Type: spinal, ITN ASA Status: 2          Anesthesia Type: spinal, ITN    Vitals  Vitals Value Taken Time   /78 21 1900   Temp 98.2 °F (36.8 °C) 21 1900   Pulse 75 21 1900   Resp 16 21 1900   SpO2 97 % 21 1124   Vitals shown include unvalidated device data.        Post Anesthesia Care and Evaluation    Patient location during evaluation: bedside  Patient participation: complete - patient participated  Level of consciousness: awake and awake and alert  Pain score: 0  Pain management: satisfactory to patient  Airway patency: patent  Anesthetic complications: No anesthetic complications  PONV Status: none  Cardiovascular status: acceptable  Respiratory status: acceptable  Hydration status: acceptable  Post Neuraxial Block status: Motor and sensory function returned to baseline and No signs or symptoms of PDPH

## 2021-04-02 ENCOUNTER — POSTPARTUM VISIT (OUTPATIENT)
Dept: OBSTETRICS AND GYNECOLOGY | Facility: CLINIC | Age: 26
End: 2021-04-02

## 2021-04-02 VITALS — SYSTOLIC BLOOD PRESSURE: 118 MMHG | DIASTOLIC BLOOD PRESSURE: 60 MMHG | WEIGHT: 112.4 LBS | BODY MASS INDEX: 20.56 KG/M2

## 2021-04-02 PROCEDURE — 0503F POSTPARTUM CARE VISIT: CPT | Performed by: NURSE PRACTITIONER

## 2021-04-02 NOTE — PROGRESS NOTES
Chief Complaint   Patient presents with   • Postpartum Care     2 week post partum       2 Week Postpartum Visit         Nancy Little is a 26 y.o.  s/p , due to history of pre-eclampisa at 39 0/7 weeks on 3/19/2021, hx traumatic delivery who presents today for a 2 week postpartum check.  Her  incision and is healing well.    Patient denies postpartum depression.  Patient describes bleeding as light.  Patient is breast feeding. She denies any breast issues.  Desires contraceptive methods: IUD for contraception after 6 week postpartum.  Patient denies bowel or bladder issues.    Postpartum Depression Screening Questionnaire: Was comppleted, and she scored a 1. no treatment indicated.  Baby Name: Male- Maxim  Baby Weight:  7 # 1 oz  Baby Discharged: Discharged with Mom  Delivering Physician:  GRACY    The additional following portions of the patient's history were reviewed and updated as appropriate: allergies, current medications, past family history, past medical history, past social history, past surgical history and problem list.    Review of Systems   Constitutional: Negative.    Gastrointestinal: Negative.    Genitourinary: Negative.    Psychiatric/Behavioral: Negative.      All other systems reviewed and are negative.     I have reviewed and agree with the HPI, ROS, and historical information as entered above. Maren Ventura, APRN    /60   Wt 51 kg (112 lb 6.4 oz)   LMP 2020 (Exact Date)   Breastfeeding Yes   BMI 20.56 kg/m²     Physical Exam  Constitutional:       Appearance: Normal appearance.   Pulmonary:      Effort: Pulmonary effort is normal.   Abdominal:      Palpations: Abdomen is soft.      Tenderness: There is no abdominal tenderness.      Comments: Incision well-healed   Neurological:      Mental Status: She is alert.             Assessment and Plan    Problem List Items Addressed This Visit     None      Visit Diagnoses     Encounter for postpartum visit    -   Primary          1. S/p , 2 weeks postpartum.  Doing well.    2. May drive, can slowly increase activity as tolerated. Continue lifting restrictions maximum 10-15lbs.   3. Follow up in 4 weeks for 6 week post-partum visit.     Maren Ventura, APRN  2021

## 2021-04-16 ENCOUNTER — BULK ORDERING (OUTPATIENT)
Dept: CASE MANAGEMENT | Facility: OTHER | Age: 26
End: 2021-04-16

## 2021-04-16 DIAGNOSIS — Z23 IMMUNIZATION DUE: ICD-10-CM

## 2021-05-03 ENCOUNTER — POSTPARTUM VISIT (OUTPATIENT)
Dept: OBSTETRICS AND GYNECOLOGY | Facility: CLINIC | Age: 26
End: 2021-05-03

## 2021-05-03 VITALS — SYSTOLIC BLOOD PRESSURE: 100 MMHG | WEIGHT: 108 LBS | DIASTOLIC BLOOD PRESSURE: 60 MMHG | BODY MASS INDEX: 19.75 KG/M2

## 2021-05-03 DIAGNOSIS — Z98.891 STATUS POST CESAREAN SECTION: Primary | ICD-10-CM

## 2021-05-03 PROCEDURE — 0503F POSTPARTUM CARE VISIT: CPT | Performed by: OBSTETRICS & GYNECOLOGY

## 2021-05-03 NOTE — PROGRESS NOTES
Chief Complaint   Patient presents with   • Postpartum Care       6 Week Postpartum Visit         Nancy Little is a 26 y.o.  s/p , due to hx of traumatic delivery at 39 weeks on 3/18/2021, who presents today for a 6 week postpartum check.      At the time of delivery were you diagnosed with any of the following: None. The incision and is healing well.    Patient denies postpartum depression.  Patient describes bleeding as absent.  Patient is breast feeding.  Desires contraceptive methods: IUD for contraception.  Patient denies bowel or bladder issues.    Postpartum Depression Screening Questionnaire: no change from last visit, no treatment indicated.  Baby Name: Maxim  Baby Weight: 7lbs 1oz  Baby Discharged: Discharged with Mom  Delivering Physician: Christina    Last Completed Pap Smear       Status Date      PAP SMEAR Done 2020 negative        Is the patient due for a pap today? No    The additional following portions of the patient's history were reviewed and updated as appropriate: allergies, current medications, past family history, past medical history, past social history, past surgical history and problem list.    Review of Systems   Constitutional: Negative.    HENT: Negative.    Eyes: Negative.    Respiratory: Negative.    Cardiovascular: Negative.    Gastrointestinal: Negative.    Endocrine: Negative.    Genitourinary:        Positive for vaginal itching   Musculoskeletal: Negative.    Skin: Negative.    Allergic/Immunologic: Negative.    Neurological: Negative.    Hematological: Negative.    Psychiatric/Behavioral: Negative.      All other systems reviewed and are negative.     I have reviewed and agree with the HPI, ROS, and historical information as entered above. Cheyanne Vasquez MD    /60   Wt 49 kg (108 lb)   LMP 2020   BMI 19.75 kg/m²     Physical Exam  Vitals and nursing note reviewed. Exam conducted with a chaperone present.   Constitutional:        Appearance: She is well-developed.   HENT:      Head: Normocephalic and atraumatic.   Pulmonary:      Effort: Pulmonary effort is normal.   Abdominal:      Palpations: Abdomen is soft. Abdomen is not rigid.   Genitourinary:     Vagina: No lesions or prolapsed vaginal walls.      Cervix: No lesion or erythema.      Uterus: Enlarged. Not tender and no uterine prolapse.       Adnexa:         Right: No mass, tenderness or fullness.          Left: No mass, tenderness or fullness.     Musculoskeletal:      Cervical back: Normal range of motion.   Neurological:      Mental Status: She is alert and oriented to person, place, and time.   Psychiatric:         Mood and Affect: Mood normal.         Behavior: Behavior normal.             Assessment and Plan    Problem List Items Addressed This Visit        Gravid and     Status post  section - Primary          1. S/p , 6 weeks postpartum.  Doing well.    2. Return to normal physical activity.  No pelvic restrictions.   3. Contraception: contraceptive methods: IUD.  Insertion date: 4 weeks  Return in about 4 weeks (around 2021) for Precert Mirena and place next visit.  4.     Cheyanne Vasquez MD  2021

## 2021-07-01 ENCOUNTER — OFFICE VISIT (OUTPATIENT)
Dept: OBSTETRICS AND GYNECOLOGY | Facility: CLINIC | Age: 26
End: 2021-07-01

## 2021-07-01 VITALS
BODY MASS INDEX: 19.69 KG/M2 | HEIGHT: 62 IN | DIASTOLIC BLOOD PRESSURE: 60 MMHG | SYSTOLIC BLOOD PRESSURE: 92 MMHG | WEIGHT: 107 LBS

## 2021-07-01 DIAGNOSIS — Z30.430 ENCOUNTER FOR IUD INSERTION: Primary | ICD-10-CM

## 2021-07-01 PROBLEM — Z97.5 IUD (INTRAUTERINE DEVICE) IN PLACE: Status: ACTIVE | Noted: 2021-07-01

## 2021-07-01 LAB
B-HCG UR QL: NEGATIVE
INTERNAL NEGATIVE CONTROL: ABNORMAL
INTERNAL POSITIVE CONTROL: ABNORMAL
Lab: ABNORMAL

## 2021-07-01 PROCEDURE — 58300 INSERT INTRAUTERINE DEVICE: CPT | Performed by: OBSTETRICS & GYNECOLOGY

## 2021-07-01 PROCEDURE — 81025 URINE PREGNANCY TEST: CPT | Performed by: OBSTETRICS & GYNECOLOGY

## 2021-07-01 NOTE — PROGRESS NOTES
Procedure: IUD Insertion Procedure Note     Procedures    Pre procedure indication 1) Desires Mirena  Post procedure indication 1) Desires Mirena  NDC #: 61233-613-07  Lot #: [id number]  Exp Date: 10/2023  BH device    The risks, benefits, and alternatives to Mirena were explained at length with the patient. All her questions were answered and consents were signed.    The patient was placed in a dorsal lithotomy position on the examining table in [facility]. A bimanual exam confirmed the uterus was normal in size, midposition. A warmed metal speculum was inserted into the vagina and the cervix was brought into view.    The cervix was prepped with Betadine. The anterior lip was grasped with a single-tooth tenaculum. The endometrial cavity was then sounded to seven cm without use of a dilator. This sealed Mirena package was opened and the IUD was removed in a sterile fashion.    The upper edge of the depth setting the flange was set at a uterine sound measured. The  was then carefully advanced to the cervical canal into the uterus to the level of the fundus.  The slider was then retracted about 1 cm and deployed the device. The device was then gently advanced to the fundus. The IUD was then released by pulling the slider down all the way. The  was removed carefully from the uterus. The threads were then cut leaving 2-3 cm visible outside of the cervix.  The single-tooth tenaculum was removed from the anterior lip. Good hemostasis was noted.     All other instruments were removed from the vagina.   There were no complications.  The patient tolerated the procedure well with a minimal amount of discomfort.    The patient was counseled about the need to return in 4 weeks with U/S for IUD check.     She was counseled about the need to use a backup method of contraception such as condoms until her post insertion exam was performed. The patient verbalized understanding that the Mirena will need to be  removed/replaced after 5 years. The patient is counseled to contact us if she has any significant or increasing bleeding, pain, fever, chills, or other concerns. She is instructed to see a doctor right away if she believes that she may be pregnant at any time with the IUD in place.    Cheyanne Vasquez MD  07/01/2021

## 2021-07-29 ENCOUNTER — OFFICE VISIT (OUTPATIENT)
Dept: OBSTETRICS AND GYNECOLOGY | Facility: CLINIC | Age: 26
End: 2021-07-29

## 2021-07-29 VITALS — DIASTOLIC BLOOD PRESSURE: 60 MMHG | SYSTOLIC BLOOD PRESSURE: 100 MMHG | WEIGHT: 109 LBS | BODY MASS INDEX: 19.94 KG/M2

## 2021-07-29 DIAGNOSIS — Z97.5 IUD (INTRAUTERINE DEVICE) IN PLACE: Primary | ICD-10-CM

## 2021-07-29 PROBLEM — O09.299 HX OF PREECLAMPSIA, PRIOR PREGNANCY, CURRENTLY PREGNANT: Status: RESOLVED | Noted: 2020-09-16 | Resolved: 2021-07-29

## 2021-07-29 PROCEDURE — 99212 OFFICE O/P EST SF 10 MIN: CPT | Performed by: OBSTETRICS & GYNECOLOGY

## 2021-07-29 NOTE — PROGRESS NOTES
Chief Complaint   Patient presents with   • Follow-up     IUD insertion follow up with US         Subjective   HPI  Nancy Little is a 26 y.o. female, , who presents for IUD check follow up.  She had an IUD placed 1 month ago.  Her LMP was No LMP recorded..  Since the IUD placement, the patient has not had any unusual complaints. Additional complaints include none.    The additional following portions of the patient's history were reviewed and updated as appropriate: allergies, current medications, past family history, past medical history, past social history, past surgical history and problem list.    Did the patient have u/s today? Yes.  Findings showed IUD had correct placement.  I have personally evaluated the U/S and agree with the findings. Cheyanne Vasquez MD    Review of Systems   Constitutional: Negative.    HENT: Negative.    Eyes: Negative.    Respiratory: Negative.    Cardiovascular: Negative.    Gastrointestinal: Negative.    Endocrine: Negative.    Genitourinary: Negative.    Musculoskeletal: Negative.    Skin: Negative.    Allergic/Immunologic: Negative.    Neurological: Negative.    Hematological: Negative.    Psychiatric/Behavioral: Negative.      All other systems reviewed and are negative.     I have reviewed and agree with the HPI, ROS, and historical information as entered above. Cheyanne Vasquez MD    Objective   /60   Wt 49.4 kg (109 lb)   BMI 19.94 kg/m²     Physical Exam  Vitals and nursing note reviewed.   Constitutional:       Appearance: Normal appearance. She is well-developed.   HENT:      Head: Normocephalic and atraumatic.   Pulmonary:      Effort: Pulmonary effort is normal.      Breath sounds: Normal breath sounds.   Abdominal:      General: There is no distension.      Palpations: Abdomen is soft. Abdomen is not rigid. There is no mass.      Tenderness: There is no abdominal tenderness. There is no guarding or rebound.   Musculoskeletal:       Cervical back: Normal range of motion.   Skin:     General: Skin is warm and dry.   Neurological:      Mental Status: She is alert and oriented to person, place, and time.   Psychiatric:         Mood and Affect: Mood normal.         Behavior: Behavior normal.         Assessment/Plan     Assessment     Problem List Items Addressed This Visit        Genitourinary and Reproductive     IUD (intrauterine device) in place - Primary    Overview     Mirena placed 7/1/2021                   Plan     1. Pelvic Ultrasound Ordered  2. Return to office PRN  Return in about 1 year (around 7/29/2022) for Annual physical.      Cheyanne Vasquez MD  07/29/2021

## 2021-12-23 ENCOUNTER — OFFICE VISIT (OUTPATIENT)
Dept: OBSTETRICS AND GYNECOLOGY | Facility: CLINIC | Age: 26
End: 2021-12-23

## 2021-12-23 VITALS — DIASTOLIC BLOOD PRESSURE: 80 MMHG | SYSTOLIC BLOOD PRESSURE: 120 MMHG | BODY MASS INDEX: 22.17 KG/M2 | WEIGHT: 121.2 LBS

## 2021-12-23 DIAGNOSIS — Z30.011 ENCOUNTER FOR INITIAL PRESCRIPTION OF CONTRACEPTIVE PILLS: Primary | ICD-10-CM

## 2021-12-23 DIAGNOSIS — Z30.432 ENCOUNTER FOR REMOVAL OF INTRAUTERINE CONTRACEPTIVE DEVICE (IUD): ICD-10-CM

## 2021-12-23 PROBLEM — Z97.5 IUD (INTRAUTERINE DEVICE) IN PLACE: Status: RESOLVED | Noted: 2021-07-01 | Resolved: 2021-12-23

## 2021-12-23 PROCEDURE — 58301 REMOVE INTRAUTERINE DEVICE: CPT | Performed by: NURSE PRACTITIONER

## 2021-12-23 RX ORDER — NORGESTIMATE AND ETHINYL ESTRADIOL 0.25-0.035
1 KIT ORAL DAILY
Qty: 84 TABLET | Refills: 1 | Status: SHIPPED | OUTPATIENT
Start: 2021-12-23 | End: 2022-06-06

## 2021-12-23 NOTE — PROGRESS NOTES
IUD Removal Procedure Note    Procedures    Type of IUD:  Mirena   Date of insertion:  Known- 7/1/2021  Reason for removal:  Side effect: Severe migraines, acnes, issues sleeping     Would like to discuss OCP.  Previously took sprintec    Consent signed.    Procedure Time Out Documentation    Procedure Details  IUD strings visible:  yes  Removal:  IUD strings grasped and IUD removed intact with gentle traction.  The patient tolerated the procedure well.    All appropriate instructions regarding removal were reviewed.    Tolerated well  No apparent complications  Post procedure diagnosis : IUD removal     Plans for contraception:  oral contraceptives (estrogen/progesterone)    The patient was advised to call for any fever or for prolonged or severe pain or bleeding. She was advised to use motrin as needed for mild to moderate pain.     Yanira Starks, APRN  12/23/2021

## 2022-06-06 DIAGNOSIS — Z30.011 ENCOUNTER FOR INITIAL PRESCRIPTION OF CONTRACEPTIVE PILLS: ICD-10-CM

## 2022-06-06 RX ORDER — NORGESTIMATE AND ETHINYL ESTRADIOL 0.25-0.035
KIT ORAL
Qty: 84 TABLET | Refills: 0 | Status: SHIPPED | OUTPATIENT
Start: 2022-06-06

## 2022-07-11 ENCOUNTER — OFFICE VISIT (OUTPATIENT)
Dept: OBSTETRICS AND GYNECOLOGY | Facility: CLINIC | Age: 27
End: 2022-07-11

## 2022-07-11 VITALS
HEIGHT: 62 IN | DIASTOLIC BLOOD PRESSURE: 66 MMHG | WEIGHT: 115 LBS | SYSTOLIC BLOOD PRESSURE: 102 MMHG | BODY MASS INDEX: 21.16 KG/M2

## 2022-07-11 DIAGNOSIS — Z01.419 PAP TEST, AS PART OF ROUTINE GYNECOLOGICAL EXAMINATION: Primary | ICD-10-CM

## 2022-07-11 DIAGNOSIS — Z80.3 FAMILY HISTORY OF BREAST CANCER IN MOTHER: ICD-10-CM

## 2022-07-11 PROCEDURE — 99395 PREV VISIT EST AGE 18-39: CPT | Performed by: OBSTETRICS & GYNECOLOGY

## 2022-07-11 NOTE — PROGRESS NOTES
Gynecologic Annual Exam Note        Annual Exam        Subjective     HPI  Nancy Little is a 27 y.o. female who presents for annual well woman exam as a established patient. There were no changes to her medical or surgical history since her last visit.. Patient reports problems with: none. Patient's last menstrual period was 06/30/2022 (exact date).. Her periods are regular every 28-30 days, lasting 4 days and are light and clots are absent. Dysmenorrhea:moderate, occurring first 1-2 days of flow. Partner Status: Marital Status: .  She is sexually active. She has not had new partners since her last STD testing. She does not desire STD testing. STD testing recommendations have been explained to the patient..     Additional OB/GYN History   Current contraception: contraceptive methods: None  Desires to: continue contraception She was not taking them correctly and she ran out. No planning vasectomy.  Thromboembolic Disease: none  Age of menarche: 12    History of STD: no    Last Pap : 08/17/2020. Results: negative. HPV: not done  Last Completed Pap Smear          Ordered - PAP SMEAR (Every 3 Years) Ordered on 7/11/2022 08/17/2020  Done - negative                 History of abnormal Pap smear: no  Gardasil status:no  Family history of uterine, colon, breast, or ovarian cancer: yes - breast cancer in her mom at age 39 - she is still living and she did not test positive for Brca gene  Performs monthly Self-Breast Exam: yes  Exercises Regularly:yes  Feelings of Anxiety or Depression: no  Tobacco Usage?: No       Current Outpatient Medications:   •  Misc. Devices (Breast Pump) misc, Use PRN, Disp: 1 each, Rfl: 0  •  norgestimate-ethinyl estradiol (ORTHO-CYCLEN) 0.25-35 MG-MCG per tablet, TAKE 1 TABLET BY MOUTH EVERY DAY, Disp: 84 tablet, Rfl: 0  •  Prenatal Vit-Fe Fumarate-FA (PRENATAL 27-1) 27-1 MG tablet tablet, Take 1 tablet by mouth Daily., Disp: , Rfl:      Patient denies the need for medication  refills today.    OB History        2    Para   2    Term   2       0    AB   0    Living   2       SAB   0    IAB   0    Ectopic   0    Molar   0    Multiple   0    Live Births   2                Health Maintenance   Topic Date Due   • Annual Gynecologic Pelvic and Breast Exam  Never done   • ANNUAL PHYSICAL  Never done   • COVID-19 Vaccine (3 - Booster for Pfizer series) 2022   • INFLUENZA VACCINE  10/01/2022   • PAP SMEAR  2023   • TDAP/TD VACCINES (2 - Td or Tdap) 2031   • HEPATITIS C SCREENING  Completed   • Pneumococcal Vaccine 0-64  Aged Out       Past Medical History:   Diagnosis Date   • Acid reflux     hx nexium   • Eczema    • History of pre-eclampsia    • Preeclampsia    • Tachycardia     after first delivery    • Urinary retention         Past Surgical History:   Procedure Laterality Date   •  SECTION N/A 3/19/2021    Procedure:  SECTION PRIMARY;  Surgeon: Cheyanne Vasquez MD;  Location:  [facility];  Service: Obstetrics/Gynecology;  Laterality: N/A;   • CHEILECTOMY Right 2016    Procedure: RT HALLUX RIGIDUS REPAIR CHONDROPLASTY QIAN OSTEOTOMY ;  Surgeon: Bob Grant MD;  Location:  [facility];  Service:    • WISDOM TOOTH EXTRACTION         The additional following portions of the patient's history were reviewed and updated as appropriate: allergies, current medications, past medical history, past social history, past surgical history and problem list.    Review of Systems   Constitutional: Negative.    HENT: Negative.    Eyes: Negative.    Respiratory: Negative.    Cardiovascular: Negative.    Gastrointestinal: Negative.    Endocrine: Negative.    Genitourinary: Negative.    Musculoskeletal: Negative.    Skin: Negative.    Allergic/Immunologic: Negative.    Neurological: Negative.    Hematological: Negative.    Psychiatric/Behavioral: Negative.          I have reviewed and agree with the HPI, ROS, and historical information as  "entered above. Cheyanne Vasquez MD        Objective   /66   Ht 157.5 cm (62\")   Wt 52.2 kg (115 lb)   LMP 06/30/2022 (Exact Date)   Breastfeeding No   BMI 21.03 kg/m²     Physical Exam  Vitals and nursing note reviewed. Exam conducted with a chaperone present.   Constitutional:       Appearance: She is well-developed.   HENT:      Head: Normocephalic and atraumatic.   Neck:      Thyroid: No thyroid mass or thyromegaly.   Cardiovascular:      Rate and Rhythm: Normal rate and regular rhythm.      Heart sounds: No murmur heard.  Pulmonary:      Effort: Pulmonary effort is normal. No retractions.      Breath sounds: Normal breath sounds. No wheezing, rhonchi or rales.   Chest:      Chest wall: No mass or tenderness.   Breasts:      Right: Normal. No mass, nipple discharge, skin change or tenderness.      Left: Normal. No mass, nipple discharge, skin change or tenderness.       Abdominal:      General: Bowel sounds are normal.      Palpations: Abdomen is soft. Abdomen is not rigid. There is no mass.      Tenderness: There is no abdominal tenderness. There is no guarding.      Hernia: No hernia is present. There is no hernia in the left inguinal area or right inguinal area.   Genitourinary:     General: Normal vulva.      Exam position: Lithotomy position.      Pubic Area: No rash.       Labia:         Right: No rash, tenderness or lesion.         Left: No rash, tenderness or lesion.       Urethra: No urethral pain or urethral swelling.      Vagina: Normal. No vaginal discharge or lesions.      Cervix: No cervical motion tenderness, discharge, lesion or cervical bleeding.      Uterus: Normal. Not enlarged, not fixed and not tender.       Adnexa:         Right: No mass, tenderness or fullness.          Left: No mass, tenderness or fullness.        Rectum: No external hemorrhoid.   Musculoskeletal:      Cervical back: Normal range of motion. No muscular tenderness.   Neurological:      Mental Status: She is " alert and oriented to person, place, and time.   Psychiatric:         Behavior: Behavior normal.            Assessment and Plan    Problem List Items Addressed This Visit        Family History    Family history of breast cancer in mother    Overview     Diagnosed at age 38.  BRCA negative.  Will refer to genetic counseling.           Relevant Orders    Ambulatory Referral to Genetics    Mammo Screening Digital Tomosynthesis Bilateral With CAD      Other Visit Diagnoses     Pap test, as part of routine gynecological examination    -  Primary    Relevant Orders    LIQUID-BASED PAP SMEAR, [facility] ([facility])          1. GYN annual well woman exam.   2. Reviewed pap guidelines.   3. Encouraged use of condoms for STD prevention.  4. Recommended use of Vitamin D replacement and getting adequate calcium in her diet. (1500mg)  5. Reviewed monthly self breast exams.  Instructed to call with lumps, pain, or breast discharge.    6. Reccommended Flu Vaccine in Fall of each year.  7. RTC in 1 year or PRN with problems  Return in about 1 year (around 7/11/2023) for Annual physical.      Cheyanne Vasquez MD  07/11/2022

## 2022-07-13 LAB — REF LAB TEST METHOD: NORMAL

## 2023-02-15 ENCOUNTER — TELEPHONE (OUTPATIENT)
Dept: MAMMOGRAPHY | Facility: HOSPITAL | Age: 28
End: 2023-02-15
Payer: COMMERCIAL

## 2023-02-15 NOTE — TELEPHONE ENCOUNTER
I spoke with Mrs. Little today and explained current SBI recommendations and Breast Imaging protocol are no Screening mammograms under age 30.  Patient has appointment scheduled with Genetics and will be advised on screening protocol from her counseling results and assessment.  Typically MRI is utilized for screening in patients under 30.  Patient was advised and is aware.

## 2023-05-15 ENCOUNTER — LAB (OUTPATIENT)
Dept: LAB | Facility: HOSPITAL | Age: 28
End: 2023-05-15
Payer: COMMERCIAL

## 2023-05-15 ENCOUNTER — CLINICAL SUPPORT (OUTPATIENT)
Dept: GENETICS | Facility: HOSPITAL | Age: 28
End: 2023-05-15
Payer: COMMERCIAL

## 2023-05-15 DIAGNOSIS — Z13.79 GENETIC TESTING: Primary | ICD-10-CM

## 2023-05-15 DIAGNOSIS — Z80.3 FAMILY HISTORY OF BREAST CANCER: Primary | ICD-10-CM

## 2023-05-15 PROCEDURE — 96040: CPT | Performed by: GENETIC COUNSELOR, MS

## 2023-05-15 NOTE — PROGRESS NOTES
Nancy Little, a 28-year-old female, was referred for genetic counseling due to a family history of breast cancer.  Ms. Little is premenopausal and retains her uterus and ovaries. She was 12 years old at menarche and 23 when she had her first child. She has not had any prior breast imaging. Ms. Little was interested in discussing her risk for a hereditary cancer syndrome.   Ms. Little decided to pursue comprehensive genetic testing to evaluate her risk of cancer, therefore the CancerNext panel was ordered through [facility] which analyzes 36 genes associated with an increased cancer risk. The genes on this panel include APC, ALICIA, AXIN2, BARD1, BMPR1A, BRCA1, BRCA2, BRIP1, CDH1, CDK4, CDKN2A, CHEK2, DICER1, EPCAM, GREM1, HOXB13, MLH1, MRE11A, MSH2, MSH3, MSH6, MUTYH, NBN, NF1, NTHL1, PALB2, PMS2, POLD1, POLE, PTEN, RAD51C, RAD51D, RECQL, SMAD4, SMARCA4, STK11, and TP53. A blood sample was collected at [facility].  Results are expected in approximately 2-3 weeks.    PERTINENT FAMILY HISTORY: (See attached pedigree)   Mother:  Breast cancer, 39 (ER+)  Mat. Grandfather: Leukemia    Paternal family history is unknown.    RISK ASSESSMENT:  Ms. Little’s family history of breast cancer raised the question of a hereditary cancer syndrome.  Ms. Little does meet NCCN criteria for genetic testing for BRCA1/2 based on maternal family history of breast cancer.  When an affected relative is not available for testing, it is appropriate to offer testing to an unaffected individual. We discussed that genetic testing is most often completed via a multigene panel, allowing evaluation of BRCA1/2 as well as other genes associated with hereditary cancer risk. Ms. Little chose to pursue multigene panel testing through [facility]. If genetic testing is negative, management should be guided by a family history-based risk assessment.    GENETIC COUNSELING (40 minutes):  We reviewed the family history information in detail.  Cases of cancer follow three general patterns: sporadic, familial, and hereditary.  While most cancer is sporadic, some cases appear to occur in family clusters.  These cases are said to be familial and account for 10-20% of cancer cases.  Familial cases may be due to a combination of shared genes and environmental factors among family members.  In even fewer cases (5-10%), the risk for cancer is inherited, and the genes responsible for the increased cancer risk are known.      Family histories typical of hereditary cancer syndromes usually include multiple first- and second-degree relatives diagnosed with cancer types that define a syndrome.  These cases tend to be diagnosed at younger-than-expected ages and can be bilateral or multifocal.  The cancer in these families follows an autosomal dominant inheritance pattern, which indicates the likely presence of a mutation in a cancer susceptibility gene.  Children and siblings of an individual believed to carry this mutation have a 50% chance of inheriting that mutation, thereby inheriting the increased risk to develop cancer.  These mutations can be passed down from the maternal or the paternal lineage.    Hereditary breast cancer accounts for 5-10% of all cases of breast cancer.  A significant proportion (50%) of hereditary breast and ovarian cancer can be attributed to mutations in the BRCA1 and BRCA2 genes.  Mutations in these genes confer an increased risk for breast cancer, ovarian cancer, male breast cancer, prostate cancer, and pancreatic cancer.  Women with a BRCA1 or BRCA2 mutation have up to an 87% lifetime risk of breast cancer and up to a 44% risk of ovarian cancer.  There are other clinically significant breast cancer and ovarian cancer related genes, as well as other, more rare, hereditary cancer syndromes. Based on Ms. Little’s family history and her desire to get more information regarding her personal risks, testing was pursued through a multigene  panel evaluating several other genes known to increase the risk for cancer.    GENETIC TESTING:  The risks, benefits, and limitations of genetic testing and implications for clinical management following testing were reviewed.  DNA test results can influence decisions regarding screening and prevention.  Genetic testing can have significant psychological implications for both individuals and families. Also discussed was the possibility of insurance discrimination based on genetic test results and the laws in place to prevent this, as well as the limitations of these laws.      We discussed panel testing, which would involve testing 36 genes associated with increased cancer risk. The implications of a positive or negative test result were discussed.  We also discussed the importance of testing on an affected relative. In general, a negative genetic test result is most informative if a mutation has first been established in an affected member of the family.  In cases where an affected individual is not available or interested in testing, it is appropriate to offer testing to an unaffected individual. We discussed the possibility that, in some cases, genetic test results may be ambiguous due to the identification of a genetic variant. These variants may or may not be associated with an increased cancer risk. With multigene panel testing, it is not uncommon for a variant of uncertain significance (VUS) to be identified.  If a VUS is identified, testing family members is typically not recommended and screening recommendations are made based on the family history.  The laboratories that perform genetic testing work to reclassify the VUS and send out an amended report if and when a VUS is reclassified.  The majority of variant findings are ultimately reclassified to a negative result. Given her family history, a negative test result does not eliminate all cancer risk, although the risk would not be as high as it would with  positive genetic testing.     PLAN: Genetic testing via the CancerNext panel through [facility] was ordered and results are expected in 2-3 weeks.  We will contact Ms. Little with her results once they are received.     Brunilda Mojica MS, [facility] [place] [facility]  Licensed Certified Genetic Counselor

## 2023-06-07 ENCOUNTER — DOCUMENTATION (OUTPATIENT)
Dept: GENETICS | Facility: HOSPITAL | Age: 28
End: 2023-06-07
Payer: COMMERCIAL

## 2023-06-07 NOTE — PROGRESS NOTES
Nancy Little, a 28-year-old female, was referred for genetic counseling due to a family history of breast cancer.  Ms. Little is premenopausal and retains her uterus and ovaries. She was 12 years old at menarche and 23 when she had her first child. She has not had any prior breast imaging. Ms. Little was interested in discussing her risk for a hereditary cancer syndrome.   Ms. Little decided to pursue comprehensive genetic testing to evaluate her risk of cancer, therefore the CancerNext panel was ordered through [facility] which analyzes 36 genes associated with an increased cancer risk. The genes on this panel include APC, ALIICA, AXIN2, BARD1, BMPR1A, BRCA1, BRCA2, BRIP1, CDH1, CDK4, CDKN2A, CHEK2, DICER1, EPCAM, GREM1, HOXB13, MLH1, MRE11A, MSH2, MSH3, MSH6, MUTYH, NBN, NF1, NTHL1, PALB2, PMS2, POLD1, POLE, PTEN, RAD51C, RAD51D, RECQL, SMAD4, SMARCA4, STK11, and TP53. Genetic testing was negative for known deleterious/pathogenic mutations in BRCA1/2 and a 34 additional hereditary cancer related genes.  While no known deleterious mutations were identified, a variant of uncertain significance (VUS) was identified in the NBN gene.  VUSs are differences in genes that may or may not affect the function of the gene.  VUSs are frequently reported with multigene panel testing, given the number of genes being evaluated and the presence of genetic variation in the population.  The classification of a variant to either a benign genetic variant or deleterious mutation depends on a number of factors.  The majority (estimated to be >90%) of VUSs are eventually reclassified as benign gene variation. These results were discussed with Ms. Little by telephone on 6/7/23.    PERTINENT FAMILY HISTORY: (See attached pedigree)   Mother:  Breast cancer, 39 (ER+)  Mat. Grandfather: Leukemia    Paternal family history is unknown.    RISK ASSESSMENT:  Ms. Little's family history of breast cancer raised the question of a hereditary cancer  syndrome.  Ms. Little does meet NCCN criteria for genetic testing for BRCA1/2 based on maternal family history of breast cancer.  When an affected relative is not available for testing, it is appropriate to offer testing to an unaffected individual. We discussed that genetic testing is most often completed via a multigene panel, allowing evaluation of BRCA1/2 as well as other genes associated with hereditary cancer risk. Ms. Little chose to pursue multigene panel testing through [facility]. If genetic testing is negative, management should be guided by a family history-based risk assessment.  Since genetic testing was negative for known deleterious/pathogenic mutations, at this time Ms. Saezs lifetime risk of developing breast cancer should be assessed using family history risk assessment models.  Using these models, Ms. Saezs  lifetime breast cancer risk is estimated to be up to 21.3% (TRICIA/Beau-Flexck, v8), in comparison to the general population risk of 12.5%.  A risk greater than 20% warrants consideration of increased screening. These risk assessments are based on the family history information provided at the time of the appointment.  The assessments could change in the future should new information be obtained.    GENETIC COUNSELING:  We reviewed the family history information in detail. Cases of cancer follow three general patterns: sporadic, familial, and hereditary.  While most cancer is sporadic, some cases appear to occur in family clusters.  These cases are said to be familial and account for 10-20% of cancer cases.  Familial cases may be due to a combination of shared genes and environmental factors among family members.  In even fewer cases (5-10%), the risk for cancer is inherited, and the genes responsible for the increased cancer risk are known.      Family histories typical of hereditary cancer syndromes usually include multiple first- and second-degree relatives diagnosed with cancer  types that define a syndrome.  These cases tend to be diagnosed at younger-than-expected ages and can be bilateral or multifocal.  The cancer in these families follows an autosomal dominant inheritance pattern, which indicates the likely presence of a mutation in a cancer susceptibility gene.  Children and siblings of an individual believed to carry this mutation have a 50% chance of inheriting that mutation, thereby inheriting the increased risk to develop cancer.  These mutations can be passed down from the maternal or the paternal lineage.    Hereditary breast cancer accounts for 5-10% of all cases of breast cancer.  A significant proportion (50%) of hereditary breast and ovarian cancer can be attributed to mutations in the BRCA1 and BRCA2 genes.  Mutations in these genes confer an increased risk for breast cancer, ovarian cancer, male breast cancer, prostate cancer, and pancreatic cancer.  Women with a BRCA1 or BRCA2 mutation have up to an 87% lifetime risk of breast cancer and up to a 44% risk of ovarian cancer.  There are other clinically significant breast cancer and ovarian cancer related genes, as well as other, more rare, hereditary cancer syndromes. Based on Ms. Little's family history and her desire to get more information regarding her personal risks, testing was pursued through a multigene panel evaluating several other genes known to increase the risk for cancer.    GENETIC TESTING:  The risks, benefits, and limitations of genetic testing and implications for clinical management following testing were reviewed.  DNA test results can influence decisions regarding screening and prevention.  Genetic testing can have significant psychological implications for both individuals and families. Also discussed was the possibility of insurance discrimination based on genetic test results and the laws in place to prevent this, as well as the limitations of these laws.      We discussed panel testing, which would  involve testing 36 genes associated with increased cancer risk. The implications of a positive or negative test result were discussed.  We also discussed the importance of testing on an affected relative. In general, a negative genetic test result is most informative if a mutation has first been established in an affected member of the family.  In cases where an affected individual is not available or interested in testing, it is appropriate to offer testing to an unaffected individual. We discussed the possibility that, in some cases, genetic test results may be ambiguous due to the identification of a genetic variant. These variants may or may not be associated with an increased cancer risk. With multigene panel testing, it is not uncommon for a variant of uncertain significance (VUS) to be identified.  If a VUS is identified, testing family members is typically not recommended and screening recommendations are made based on the family history.  The laboratories that perform genetic testing work to reclassify the VUS and send out an amended report if and when a VUS is reclassified.  The majority of variant findings are ultimately reclassified to a negative result. Given her family history, a negative test result does not eliminate all cancer risk, although the risk would not be as high as it would with positive genetic testing.     TEST RESULTS:  Genetic testing was negative for known deleterious mutations by sequencing and rearrangement testing for the 36 genes on this panel.   A variant of uncertain significance (VUS) was identified in the NBN gene, however the majority (>90%) of VUS's are ultimately reclassified as benign, therefore VUSs are not clinically actionable findings.  If this variant is ever reclassified a new report will be issued by the laboratory and released directly to the ordering physician, and our office.  It is possible that there is a genetic mutation present in the family that Ms. Little  did not inherit.  If a relative was identified to have a pathogenic mutation in a hereditary cancer gene, that information could be used to update Ms. Little's risk assessment.    CANCER PREVENTION:  Despite genetic testing that was negative for a known pathogenic mutation, Ms. Little's lifetime risk for breast cancer is estimated to be above 20% based on her family history. Given Ms. Little's increased risk, options available to individuals with an elevated lifetime risk for breast cancer were discussed, including increased surveillance.    Increased surveillance, based on NCCN guidelines, would consist of semi-annual clinical breast exam and monthly self-breast exam starting by age 18 and annual mammography typically starting ten years prior to the youngest diagnosis in the family or by age 40.  Annual mammography typically does not start prior to age 30, per NCCN and ACR recommendations.  According to an American Cancer Society expert panel and NCCN guidelines, annual breast MRI should be offered to women whose lifetime risk of breast cancer is 20-25 percent or more, again starting ten years prior to the youngest diagnosis. It would be appropriate to recommend a screening breast MRI for Ms. Little now at age 28 based on her mother's age at diagnosis.  At age 30, she would begin annual mammogram and annual breast MRI, typically these are alternating every 6 months. These recommendations could change should there be updates to the family history.          PLAN: Genetic counseling remains available to Ms. Little.  We discussed the availability of the [facility] at [facility], with the purpose of coordinating increased breast screening.  Ms. Little could be referred to this clinic at any time.  If Ms. Little has any questions, concerns, or updates to the family history she is welcome to call me at [phone].     Brunilda Mojica, MS, [facility] [place], [place], [facility]  Licensed Certified Genetic  Counselor    Cc: MD Nancy Ramon

## 2023-12-19 ENCOUNTER — OFFICE VISIT (OUTPATIENT)
Dept: OBSTETRICS AND GYNECOLOGY | Facility: CLINIC | Age: 28
End: 2023-12-19
Payer: COMMERCIAL

## 2023-12-19 VITALS
SYSTOLIC BLOOD PRESSURE: 106 MMHG | DIASTOLIC BLOOD PRESSURE: 70 MMHG | BODY MASS INDEX: 22.26 KG/M2 | WEIGHT: 121 LBS | HEIGHT: 62 IN

## 2023-12-19 DIAGNOSIS — Z01.419 WOMEN'S ANNUAL ROUTINE GYNECOLOGICAL EXAMINATION: Primary | ICD-10-CM

## 2023-12-19 DIAGNOSIS — Z80.3 FAMILY HISTORY OF BREAST CANCER IN MOTHER: ICD-10-CM

## 2023-12-19 NOTE — PROGRESS NOTES
Gynecologic Annual Exam Note        Gynecologic Exam        Subjective     HPI  Nancy Little is a 28 y.o.  female who presents for annual well woman exam as a established patient. There were no changes to her medical or surgical history since her last visit.. Patient reports problems with: none. Patient's last menstrual period was 2023.. Her periods occur every 25-35 days , lasting 4 days. The flow is light.. She reports dysmenorrhea is mild, occurring first 1-2 days of flow. Partner Status: Marital Status: .  She is sexually active. She has not had new partners.. STD testing recommendations have been explained to the patient and she does not desire STD testing.    Additional OB/GYN History   Current contraception: contraceptive methods: Withdrawal   Desires to: do not start contraception      History of STD: no    Last Pap : 22. Results: negative. HPV: negative.   Last Completed Pap Smear            PAP SMEAR (Every 3 Years) Next due on 2022  LIQUID-BASED PAP SMEAR, [facility] ([facility])    2020  Done - negative                     History of abnormal Pap smear: no  Gardasil status:uncertain if she received the vaccine  Family history of uterine, colon, breast, or ovarian cancer: yes - breast cancer in her mother. Pt. Has seen genetic counseling since her last visit.  Performs monthly Self-Breast Exam: yes  Exercises Regularly:yes  Feelings of Anxiety or Depression: no  Tobacco Usage?: No     No current outpatient medications on file.     Patient denies the need for medication refills today.    OB History          2    Para   2    Term   2       0    AB   0    Living   2         SAB   0    IAB   0    Ectopic   0    Molar   0    Multiple   0    Live Births   2                Health Maintenance   Topic Date Due    ANNUAL PHYSICAL  Never done    Annual Gynecologic Pelvic and Breast Exam  2023    INFLUENZA VACCINE  2023     "COVID-19 Vaccine (3 - -24 season) 2023    PAP SMEAR  2025    TDAP/TD VACCINES (2 - Td or Tdap) 2031    HEPATITIS C SCREENING  Completed    Pneumococcal Vaccine 0-64  Aged Out       Past Medical History:   Diagnosis Date    Acid reflux     hx nexium    Eczema     History of pre-eclampsia     Preeclampsia     Tachycardia     after first delivery     Urinary retention         Past Surgical History:   Procedure Laterality Date     SECTION N/A 3/19/2021    Procedure:  SECTION PRIMARY;  Surgeon: Cheyanne Vasquez MD;  Location:  [facility];  Service: Obstetrics/Gynecology;  Laterality: N/A;    CHEILECTOMY Right 2016    Procedure: RT HALLUX RIGIDUS REPAIR CHONDROPLASTY QIAN OSTEOTOMY ;  Surgeon: Bob Grant MD;  Location:  [facility] [place];  Service:     WISDOM TOOTH EXTRACTION         The additional following portions of the patient's history were reviewed and updated as appropriate: allergies, current medications, past family history, past medical history, past social history, past surgical history, and problem list.    Review of Systems   Constitutional: Negative.    HENT: Negative.     Eyes: Negative.    Respiratory: Negative.     Cardiovascular: Negative.    Gastrointestinal: Negative.    Endocrine: Negative.    Genitourinary: Negative.    Musculoskeletal: Negative.    Skin: Negative.    Allergic/Immunologic: Negative.    Neurological: Negative.    Hematological: Negative.    Psychiatric/Behavioral: Negative.           I have reviewed and agree with the HPI, ROS, and historical information as entered above. Cheyanne Vasquez MD          Objective   /70   Ht 157.5 cm (62\")   Wt 54.9 kg (121 lb)   LMP 2023   BMI 22.13 kg/m²     Physical Exam  Vitals and nursing note reviewed. Exam conducted with a chaperone present.   Constitutional:       Appearance: She is well-developed.   HENT:      Head: Normocephalic and atraumatic.   Neck:      " Thyroid: No thyroid mass or thyromegaly.   Cardiovascular:      Rate and Rhythm: Normal rate and regular rhythm.      Heart sounds: No murmur heard.  Pulmonary:      Effort: Pulmonary effort is normal. No retractions.      Breath sounds: Normal breath sounds. No wheezing, rhonchi or rales.   Chest:      Chest wall: No mass or tenderness.   Breasts:     Right: Normal. No mass, nipple discharge, skin change or tenderness.      Left: Normal. No mass, nipple discharge, skin change or tenderness.   Abdominal:      General: Bowel sounds are normal.      Palpations: Abdomen is soft. Abdomen is not rigid. There is no mass.      Tenderness: There is no abdominal tenderness. There is no guarding.      Hernia: No hernia is present. There is no hernia in the left inguinal area or right inguinal area.   Genitourinary:     General: Normal vulva.      Exam position: Lithotomy position.      Pubic Area: No rash.       Labia:         Right: No rash, tenderness or lesion.         Left: No rash, tenderness or lesion.       Urethra: No urethral pain or urethral swelling.      Vagina: Normal. No vaginal discharge or lesions.      Cervix: No cervical motion tenderness, discharge, lesion or cervical bleeding.      Uterus: Normal. Not enlarged, not fixed and not tender.       Adnexa:         Right: No mass, tenderness or fullness.          Left: No mass, tenderness or fullness.        Rectum: No external hemorrhoid.   Musculoskeletal:      Cervical back: Normal range of motion. No muscular tenderness.   Neurological:      Mental Status: She is alert and oriented to person, place, and time.   Psychiatric:         Behavior: Behavior normal.            Assessment and Plan    Problem List Items Addressed This Visit          Family History    Family history of breast cancer in mother    Overview     Diagnosed at age 38.  BRCA negative.  Patient a candidate for yearly mammograms and yearly MRIs starting at age 30 with the risk of 21%.          Relevant Orders    MRI Breast Bilateral Screening With & Without Contrast    Mammo Screening Digital Tomosynthesis Bilateral With CAD     Other Visit Diagnoses       Women's annual routine gynecological examination    -  Primary            GYN annual well woman exam.   Reviewed pap guidelines.   Encouraged use of condoms for STD prevention.  Recommended use of Vitamin D replacement and getting adequate calcium in her diet. (1500mg)  Reviewed monthly self breast exams.  Instructed to call with lumps, pain, or breast discharge.    Reviewed exercise as a preventative health measures.   Reccommended Flu Vaccine in Fall of each year.  RTC in 1 year or PRN with problems  Return in about 1 year (around 12/19/2024) for Annual physical.    Cheyanne Vasquez MD  12/19/2023

## 2024-02-26 ENCOUNTER — HOSPITAL ENCOUNTER (OUTPATIENT)
Dept: MAMMOGRAPHY | Facility: HOSPITAL | Age: 29
Discharge: HOME | End: 2024-02-26
Admitting: OBSTETRICS & GYNECOLOGY
Payer: COMMERCIAL

## 2024-02-26 DIAGNOSIS — Z80.3 FAMILY HISTORY OF BREAST CANCER IN MOTHER: ICD-10-CM

## 2024-02-26 PROCEDURE — 77067 SCR MAMMO BI INCL CAD: CPT

## 2024-02-26 PROCEDURE — 77063 BREAST TOMOSYNTHESIS BI: CPT

## 2024-02-29 PROCEDURE — 77067 SCR MAMMO BI INCL CAD: CPT | Performed by: RADIOLOGY

## 2024-02-29 PROCEDURE — 77063 BREAST TOMOSYNTHESIS BI: CPT | Performed by: RADIOLOGY

## 2024-06-10 ENCOUNTER — TELEPHONE (OUTPATIENT)
Dept: OBSTETRICS AND GYNECOLOGY | Facility: CLINIC | Age: 29
End: 2024-06-10
Payer: COMMERCIAL

## 2024-06-10 ENCOUNTER — TELEPHONE (OUTPATIENT)
Dept: OBSTETRICS AND GYNECOLOGY | Facility: CLINIC | Age: 29
End: 2024-06-10

## 2024-06-10 NOTE — TELEPHONE ENCOUNTER
Caller: Nancy Little    Relationship: Self    Best call back number:    [phone]    What is the best time to reach you:     ASAP    Who are you requesting to speak with (clinical staff, provider,  specific staff member):         Do you know the name of the person who called:     JENNA    What was the call regarding:     NEW OB APPT

## 2024-06-10 NOTE — TELEPHONE ENCOUNTER
Caller: Nancy Little    Relationship to patient: Self    Best call back number: [phone]    Chief complaint: NEW OB SCHEDULING LMP- 5-9    Type of visit: NEW OB AND OB U/S - DR. FISHER    Requested date:  AROUND LUNCHTIME IS BEST    If rescheduling, when is the original appointment:      Additional notes:

## 2024-07-11 ENCOUNTER — TELEPHONE (OUTPATIENT)
Dept: MRI IMAGING | Facility: HOSPITAL | Age: 29
End: 2024-07-11
Payer: COMMERCIAL

## 2024-07-11 NOTE — TELEPHONE ENCOUNTER
Gave patient a call to remind her to call with her day one in August 2024 to schedule her high risk screening breast MRI. She said she is pregnant and due Feb 2025. She is unable to complete a screening breast MRI when pregnant due to the MRI contrast. Encouraged her to follow up with her doctor about needing a mammogram order for 2025 and they could place a new order next year when she is ready to schedule the high risk screening MRI. She is able to be breastfeeding and get her MRI completed.

## 2024-07-15 ENCOUNTER — INITIAL PRENATAL (OUTPATIENT)
Dept: OBSTETRICS AND GYNECOLOGY | Facility: CLINIC | Age: 29
End: 2024-07-15
Payer: COMMERCIAL

## 2024-07-15 VITALS — DIASTOLIC BLOOD PRESSURE: 70 MMHG | BODY MASS INDEX: 22.86 KG/M2 | SYSTOLIC BLOOD PRESSURE: 108 MMHG | WEIGHT: 125 LBS

## 2024-07-15 DIAGNOSIS — K21.9 GASTROESOPHAGEAL REFLUX IN PREGNANCY: ICD-10-CM

## 2024-07-15 DIAGNOSIS — Z87.59 HISTORY OF PRE-ECLAMPSIA: Primary | ICD-10-CM

## 2024-07-15 DIAGNOSIS — Z34.90 PRENATAL CARE, ANTEPARTUM: ICD-10-CM

## 2024-07-15 DIAGNOSIS — O99.619 GASTROESOPHAGEAL REFLUX IN PREGNANCY: ICD-10-CM

## 2024-07-15 DIAGNOSIS — Z98.891 PREVIOUS CESAREAN SECTION: ICD-10-CM

## 2024-07-15 PROCEDURE — 0501F PRENATAL FLOW SHEET: CPT | Performed by: OBSTETRICS & GYNECOLOGY

## 2024-07-15 RX ORDER — PANTOPRAZOLE SODIUM 20 MG/1
20 TABLET, DELAYED RELEASE ORAL DAILY
Qty: 30 TABLET | Refills: 9 | Status: SHIPPED | OUTPATIENT
Start: 2024-07-15 | End: 2025-07-15

## 2024-07-15 RX ORDER — FAMOTIDINE 20 MG/1
20 TABLET, FILM COATED ORAL 2 TIMES DAILY
COMMUNITY
End: 2024-07-15

## 2024-07-15 NOTE — PROGRESS NOTES
Initial ob visit     CC- Here for care of pregnancy        Nancy Little is a 29 y.o. female, , who presents for her first obstetrical visit.  Patient's last menstrual period was 2024.. Her PEDRITO is 2025, by Ultrasound. Current GA is 11w1d.     Initial positive test date : 24, UPT        Her periods are every 28 days.  Prior obstetric issues: pre-eclampsia/c/s  Patient's past medical history is significant for:  none .  Family history of genetic issues (includes FOB): none  Prior infections concerning in pregnancy (Rash, fever in last 2 weeks): No  Varicella Hx - vaccinated  Prior testing for Cystic Fibrosis Carrier or Sickle Cell Trait- never  Prepregnancy BMI - Body mass index is 22.86 kg/m².  History of STD: no  Hx of HSV for patient or partner: no  US done today: Yes.  Findings showed single viable IUP with FHR of 176bpm measuring 11w1d.  I have personally evaluated the U/S and agree with the findings. Cheyanne Vasquez MD      OB History    Para Term  AB Living   3 2 2 0 0 2   SAB IAB Ectopic Molar Multiple Live Births   0 0 0 0 0 2      # Outcome Date GA Lbr John/2nd Weight Sex Type Anes PTL Lv   3 Current            2 Term 21 39w0d  3215 g (7 lb 1.4 oz) M CS-LTranv Spinal  MAE   1 Term 19 38w5d 04:00 / 00:29 2655 g (5 lb 13.7 oz) F Vag-Vacuum EPI N MAE       Additional Pertinent History   Last Pap : 22 Result: negative HPV: negative     Last Completed Pap Smear            PAP SMEAR (Every 3 Years) Next due on 2022  LIQUID-BASED PAP SMEAR, [facility] ([facility])    2020  Done - negative                  History of abnormal Pap smear: no  Family history of uterine, colon, breast, or ovarian cancer: yes - mother had breast cancer  Feelings of Anxiety or Depression: no  Tobacco Usage?: No   Alcohol/Drug Use?: NO  Over the age of 35 at delivery: no  Genetic Screening: desires no genetic testing      [facility]    Current Outpatient  Medications:     pantoprazole (Protonix) 20 MG EC tablet, Take 1 tablet by mouth Daily., Disp: 30 tablet, Rfl: 9    Prenatal Vit-Fe Fumarate-FA (PRENATAL VITAMIN PO), Take  by mouth., Disp: , Rfl:      Past Medical History:   Diagnosis Date    Acid reflux     hx nexium    BRCA1 negative     BRCA2 negative     Eczema     History of pre-eclampsia     Preeclampsia     Tachycardia     after first delivery     Urinary retention         Past Surgical History:   Procedure Laterality Date     SECTION N/A 3/19/2021    Procedure:  SECTION PRIMARY;  Surgeon: Cheyanne Vasquez MD;  Location:  [facility];  Service: Obstetrics/Gynecology;  Laterality: N/A;    CHEILECTOMY Right 2016    Procedure: RT HALLUX RIGIDUS REPAIR CHONDROPLASTY QIAN OSTEOTOMY ;  Surgeon: Bob Grant MD;  Location:  [facility] [place];  Service:     WISDOM TOOTH EXTRACTION         Review of Systems   Review of Systems    Patient Reports:  acid reflux and nausea with daily vomiting. Feels it is more related to her acid reflux  Patient Denies:vaginal bleeding  All systems reviewed and otherwise normal.    I have reviewed and agree with the HPI, ROS, and historical information as entered above. Cheyanne Vasquez MD      /70   Wt 56.7 kg (125 lb)   LMP 2024   BMI 22.86 kg/m²     The additional following portions of the patient's history were reviewed and updated as appropriate: allergies, current medications, past family history, past medical history, past social history, past surgical history, and problem list.    Physical Exam  General:  well developed; well nourished  no acute distress   Chest/Respiratory: No labored breathing, normal respiratory effort, normal appearance, no respiratory noises noted   Heart:  normal rate, regular rhythm,  no murmurs, rubs, or gallops   Thyroid: normal to inspection and palpation   Breasts:  Not performed.   Abdomen: soft, non-tender; no masses  no umbilical or inguinal  hernias are present  no hepato-splenomegaly   Pelvis: Not performed.        Assessment and Plan    Problem List Items Addressed This Visit          Gravid and     History of pre-eclampsia - Primary    Overview     G1 2019 38w5d VAVD, got magnesium         Relevant Orders    AlP+ALT+AST+Creat+LD+TBili+..    Prenatal care, antepartum    Overview      VAVD 38 weeks Linley 5 pounds 14 ounces.  IOL for pre-e, got mag  3/2021 C/S @ 39w 7#1oz baby boy elective         Relevant Orders    Obstetric Panel    HIV-1 / O / 2 Ag / Antibody    Urine Culture - Urine, Urine, Clean Catch    Urinalysis With Microscopic - Urine, Clean Catch    AlP+ALT+AST+Creat+LD+TBili+..    Chlamydia trachomatis, Neisseria gonorrhoeae, PCR - Urine, Urine, Clean Catch    Previous  section    Overview     3/2021 C/S @ 39w 7#1oz baby boy elective          Other Visit Diagnoses       Gastroesophageal reflux in pregnancy        Relevant Medications    pantoprazole (Protonix) 20 MG EC tablet            Pregnancy at 11w1d  Reviewed routine prenatal care with the office and educational materials given  Patient is on Prenatal vitamins  discussed baby aspirin from 10-36 weeks for prevention of preeclampsia   baseline 24 hour urine protein recommended. Instructions and equipment given to return sample at next visit.  baseline PEP today  Return in about 4 weeks (around 2024).      Cheyanne Vasquez MD  07/15/2024

## 2024-07-16 LAB
ABO GROUP BLD: NORMAL
ALP SERPL-CCNC: 57 IU/L (ref 44–121)
ALT SERPL-CCNC: 7 IU/L (ref 0–32)
APPEARANCE UR: CLEAR
AST SERPL-CCNC: 13 IU/L (ref 0–40)
BACTERIA #/AREA URNS HPF: NORMAL /[HPF]
BASOPHILS # BLD AUTO: 0 X10E3/UL (ref 0–0.2)
BASOPHILS NFR BLD AUTO: 0 %
BILIRUB SERPL-MCNC: 0.2 MG/DL (ref 0–1.2)
BILIRUB UR QL STRIP: NEGATIVE
BLD GP AB SCN SERPL QL: NEGATIVE
C TRACH RRNA SPEC QL NAA+PROBE: NEGATIVE
CASTS URNS QL MICRO: NORMAL /LPF
COLOR UR: YELLOW
CREAT SERPL-MCNC: 0.72 MG/DL (ref 0.57–1)
EGFRCR SERPLBLD CKD-EPI 2021: 115 ML/MIN/1.73
EOSINOPHIL # BLD AUTO: 0.4 X10E3/UL (ref 0–0.4)
EOSINOPHIL NFR BLD AUTO: 4 %
EPI CELLS #/AREA URNS HPF: NORMAL /HPF (ref 0–10)
ERYTHROCYTE [DISTWIDTH] IN BLOOD BY AUTOMATED COUNT: 12.6 % (ref 11.7–15.4)
GLUCOSE UR QL STRIP: ABNORMAL
HBV SURFACE AG SERPL QL IA: NEGATIVE
HCT VFR BLD AUTO: 39.3 % (ref 34–46.6)
HCV IGG SERPL QL IA: NON REACTIVE
HGB BLD-MCNC: 13.1 G/DL (ref 11.1–15.9)
HGB UR QL STRIP: NEGATIVE
HIV 1+2 AB+HIV1 P24 AG SERPL QL IA: NON REACTIVE
IMM GRANULOCYTES # BLD AUTO: 0.1 X10E3/UL (ref 0–0.1)
IMM GRANULOCYTES NFR BLD AUTO: 1 %
KETONES UR QL STRIP: NEGATIVE
LDH SERPL L TO P-CCNC: 187 IU/L (ref 119–226)
LEUKOCYTE ESTERASE UR QL STRIP: NEGATIVE
LYMPHOCYTES # BLD AUTO: 1.7 X10E3/UL (ref 0.7–3.1)
LYMPHOCYTES NFR BLD AUTO: 19 %
MCH RBC QN AUTO: 29.8 PG (ref 26.6–33)
MCHC RBC AUTO-ENTMCNC: 33.3 G/DL (ref 31.5–35.7)
MCV RBC AUTO: 90 FL (ref 79–97)
MICRO URNS: ABNORMAL
MICRO URNS: ABNORMAL
MONOCYTES # BLD AUTO: 0.4 X10E3/UL (ref 0.1–0.9)
MONOCYTES NFR BLD AUTO: 5 %
N GONORRHOEA RRNA SPEC QL NAA+PROBE: NEGATIVE
NEUTROPHILS # BLD AUTO: 6.5 X10E3/UL (ref 1.4–7)
NEUTROPHILS NFR BLD AUTO: 71 %
NITRITE UR QL STRIP: NEGATIVE
PH UR STRIP: 6 [PH] (ref 5–7.5)
PLATELET # BLD AUTO: 257 X10E3/UL (ref 150–450)
PROT UR QL STRIP: ABNORMAL
RBC # BLD AUTO: 4.39 X10E6/UL (ref 3.77–5.28)
RBC #/AREA URNS HPF: NORMAL /HPF (ref 0–2)
RH BLD: POSITIVE
RPR SER QL: NON REACTIVE
RUBV IGG SERPL IA-ACNC: 4.7 INDEX
SP GR UR STRIP: 1.02 (ref 1–1.03)
URATE SERPL-MCNC: 3.2 MG/DL (ref 2.6–6.2)
UROBILINOGEN UR STRIP-MCNC: 0.2 MG/DL (ref 0.2–1)
WBC # BLD AUTO: 9.1 X10E3/UL (ref 3.4–10.8)
WBC #/AREA URNS HPF: NORMAL /HPF (ref 0–5)

## 2024-07-17 LAB
BACTERIA UR CULT: NORMAL
BACTERIA UR CULT: NORMAL

## 2024-08-13 ENCOUNTER — ROUTINE PRENATAL (OUTPATIENT)
Dept: OBSTETRICS AND GYNECOLOGY | Facility: CLINIC | Age: 29
End: 2024-08-13
Payer: COMMERCIAL

## 2024-08-13 VITALS — SYSTOLIC BLOOD PRESSURE: 100 MMHG | WEIGHT: 123 LBS | DIASTOLIC BLOOD PRESSURE: 70 MMHG | BODY MASS INDEX: 22.5 KG/M2

## 2024-08-13 DIAGNOSIS — Z34.90 PRENATAL CARE, ANTEPARTUM: ICD-10-CM

## 2024-08-13 DIAGNOSIS — Z87.59 HISTORY OF PRE-ECLAMPSIA: ICD-10-CM

## 2024-08-13 DIAGNOSIS — Z98.891 PREVIOUS CESAREAN SECTION: Primary | ICD-10-CM

## 2024-08-13 LAB
GLUCOSE UR STRIP-MCNC: NEGATIVE MG/DL
PROT UR STRIP-MCNC: NEGATIVE MG/DL

## 2024-08-13 PROCEDURE — 0502F SUBSEQUENT PRENATAL CARE: CPT | Performed by: OBSTETRICS & GYNECOLOGY

## 2024-08-13 NOTE — PROGRESS NOTES
OB FOLLOW UP  CC- Here for care of pregnancy        Nancy Little is a 29 y.o.  15w2d patient being seen today for her obstetrical follow up visit. Patient reports nausea and vomiting aprx 2-3 times per week.    Her prenatal care is complicated by (and status) : see below.  Patient Active Problem List   Diagnosis    Family history of breast cancer in mother    History of pre-eclampsia    Prenatal care, antepartum    Previous  section         Ultrasound Today: No    AFP: declines    ROS -   Patient Denies: vaginal bleeding and excessive vomiting  All other systems reviewed and are negative.       The additional following portions of the patient's history were reviewed and updated as appropriate: allergies, current medications, past family history, past medical history, past social history, past surgical history, and problem list.      I have reviewed and agree with the HPI, ROS, and historical information as entered above. Cheyanne Vasquez MD          EXAM:     Prenatal Vitals  BP: 100/70  Weight: 55.8 kg (123 lb)   Fetal Heart Rate: 143         Urine Glucose Read-only: Negative  Urine Protein Read-only: Negative           Assessment and Plan    Problem List Items Addressed This Visit          Gravid and     History of pre-eclampsia    Overview     G1 2019 38w5d VAVD, got magnesium  Baseline labs within normal limits.         Relevant Orders    Protein, Urine, 24 Hour - Urine, Clean Catch    Prenatal care, antepartum    Overview      VAVD 38 weeks Linley 5 pounds 14 ounces.  IOL for pre-e, got mag  3/2021 C/S @ 39w 7#1oz baby boy elective         Relevant Orders    US Ob 14 + Weeks Single or First Gestation    POC Urinalysis Dipstick (Completed)    Previous  section - Primary    Overview     3/2021 C/S @ 39w 7#1oz baby boy elective            Pregnancy at 15w2d  Fetal status reassuring.   Discussed diet and how to improve nausea.  Advised Pepcid for  reflux.  Counseled on MSAFP alone in relation to OTD and placental issues.    Anatomy scan next visit.   Activity and Exercise discussed.  Patient is on Prenatal vitamins  Return in about 5 weeks (around 9/17/2024) for anatomy usg.    Cheyanne Vasquez MD  08/13/2024

## 2024-08-14 LAB
PROT 24H UR-MRATE: 71.5 MG/24HOURS (ref 0–150)
PROT UR-MCNC: 11 MG/DL

## 2024-09-17 ENCOUNTER — ROUTINE PRENATAL (OUTPATIENT)
Dept: OBSTETRICS AND GYNECOLOGY | Facility: CLINIC | Age: 29
End: 2024-09-17
Payer: COMMERCIAL

## 2024-09-17 VITALS — BODY MASS INDEX: 23.05 KG/M2 | DIASTOLIC BLOOD PRESSURE: 80 MMHG | SYSTOLIC BLOOD PRESSURE: 110 MMHG | WEIGHT: 126 LBS

## 2024-09-17 DIAGNOSIS — Z87.59 HISTORY OF PRE-ECLAMPSIA: ICD-10-CM

## 2024-09-17 DIAGNOSIS — Z98.891 PREVIOUS CESAREAN SECTION: ICD-10-CM

## 2024-09-17 DIAGNOSIS — Z34.90 PRENATAL CARE, ANTEPARTUM: Primary | ICD-10-CM

## 2024-09-17 LAB
GLUCOSE UR STRIP-MCNC: NEGATIVE MG/DL
PROT UR STRIP-MCNC: ABNORMAL MG/DL

## 2024-09-17 PROCEDURE — 0502F SUBSEQUENT PRENATAL CARE: CPT | Performed by: OBSTETRICS & GYNECOLOGY

## 2024-09-17 RX ORDER — ASPIRIN 81 MG/1
81 TABLET, CHEWABLE ORAL DAILY
COMMUNITY

## 2024-09-25 ENCOUNTER — TELEPHONE (OUTPATIENT)
Dept: OBSTETRICS AND GYNECOLOGY | Facility: CLINIC | Age: 29
End: 2024-09-25
Payer: COMMERCIAL

## 2024-10-24 ENCOUNTER — ROUTINE PRENATAL (OUTPATIENT)
Dept: OBSTETRICS AND GYNECOLOGY | Facility: CLINIC | Age: 29
End: 2024-10-24
Payer: COMMERCIAL

## 2024-10-24 VITALS — DIASTOLIC BLOOD PRESSURE: 68 MMHG | BODY MASS INDEX: 23.76 KG/M2 | WEIGHT: 129.9 LBS | SYSTOLIC BLOOD PRESSURE: 106 MMHG

## 2024-10-24 DIAGNOSIS — Z87.59 HISTORY OF PRE-ECLAMPSIA: ICD-10-CM

## 2024-10-24 DIAGNOSIS — Z34.90 PRENATAL CARE, ANTEPARTUM: ICD-10-CM

## 2024-10-24 DIAGNOSIS — Z98.891 PREVIOUS CESAREAN SECTION: Primary | ICD-10-CM

## 2024-10-24 LAB
GLUCOSE UR STRIP-MCNC: NEGATIVE MG/DL
PROT UR STRIP-MCNC: NEGATIVE MG/DL

## 2024-10-24 NOTE — PROGRESS NOTES
OB FOLLOW UP  CC- Here for care of pregnancy        Nancy Little is a 29 y.o.  25w4d patient being seen today for her obstetrical follow up visit. Patient reports no complaints.     Her prenatal care is complicated by (and status) : see below.  Patient Active Problem List   Diagnosis    Family history of breast cancer in mother    History of pre-eclampsia    Prenatal care, antepartum    Previous  section       Flu Status: Desires at future appt  Ultrasound Today: No  Reviewed 1 hr glucose testing and TDAP next visit.    ROS -   Patient Denies: leaking of fluid, vaginal bleeding, dysuria, excessive vomiting, and more than 6 contractions per hour  Fetal Movement : normal  All other systems reviewed and are negative.       The additional following portions of the patient's history were reviewed and updated as appropriate: allergies and current medications.      I have reviewed and agree with the HPI, ROS, and historical information as entered above. Cheyanne Vasquez MD      /68   Wt 58.9 kg (129 lb 14.4 oz)   LMP 2024   BMI 23.76 kg/m²       EXAM:     Prenatal Vitals  BP: 106/68  Weight: 58.9 kg (129 lb 14.4 oz)   Fetal Heart Rate: 144      Fundal Height (cm): 25 cm        Urine Glucose Read-only: Negative  Urine Protein Read-only: Negative       Assessment and Plan    Problem List Items Addressed This Visit          Gravid and     History of pre-eclampsia    Overview     G1 2019 38w5d VAVD, got magnesium  Baseline labs within normal limits.  Baseline 24-hour urine 11         Prenatal care, antepartum    Overview      VAVD 38 weeks Linley 5 pounds 14 ounces.  IOL for pre-e, got mag  3/2021 C/S @ 39w 7#1oz baby boy elective         Relevant Orders    POC Urinalysis Dipstick (Completed)    Previous  section - Primary    Overview     3/2021 C/S @ 39w 7#1oz baby boy elective    Repeat scheduled on 2025 at 7:30 AM            Pregnancy at  25w4d  Fetal status reassuring.  No US indicated today.  1 hour gtt, CBC, Antibody screen, TDAP, and RPR next visit. Instructions given  Discussed/encouraged TDAP vaccination after 28 weeks  Activity and Exercise discussed.  Return in about 3 weeks (around 11/14/2024) for glucola.      Cheyanne Vasquez MD  10/24/2024

## 2024-11-12 ENCOUNTER — ROUTINE PRENATAL (OUTPATIENT)
Dept: OBSTETRICS AND GYNECOLOGY | Facility: CLINIC | Age: 29
End: 2024-11-12
Payer: COMMERCIAL

## 2024-11-12 VITALS — BODY MASS INDEX: 24.33 KG/M2 | SYSTOLIC BLOOD PRESSURE: 116 MMHG | DIASTOLIC BLOOD PRESSURE: 68 MMHG | WEIGHT: 133 LBS

## 2024-11-12 DIAGNOSIS — Z98.891 PREVIOUS CESAREAN SECTION: ICD-10-CM

## 2024-11-12 DIAGNOSIS — Z34.90 PRENATAL CARE, ANTEPARTUM: Primary | ICD-10-CM

## 2024-11-12 DIAGNOSIS — Z87.59 HISTORY OF PRE-ECLAMPSIA: ICD-10-CM

## 2024-11-12 LAB
GLUCOSE UR STRIP-MCNC: NEGATIVE MG/DL
PROT UR STRIP-MCNC: NEGATIVE MG/DL

## 2024-11-12 NOTE — PROGRESS NOTES
OB FOLLOW UP  CC- Here for care of pregnancy        Nancy Little is a 29 y.o.  28w2d patient being seen today for her obstetrical follow up. Patient reports no complaints.     Patient undergoing Glucola testing today. She is due for her testing at 0947.       MBT: A+  Rhogam: is not indicated.  28 week packet: reviewed with patient , counseled on fetal movement , pediatrician list reviewed, and childbirth classes reviewed  TDAP: given today  Flu Status: Will give in office today  Ultrasound Today: No    Her prenatal care is complicated by (and status) : see below.  Patient Active Problem List   Diagnosis    Family history of breast cancer in mother    History of pre-eclampsia    Prenatal care, antepartum    Previous  section         ROS -   Patient Denies: Loss of Fluid, Vaginal Spotting, Vision Changes, Headaches, and Contractions  Fetal Movement : normal    The additional following portions of the patient's history were reviewed and updated as appropriate: allergies, current medications, past family history, past medical history, past social history, past surgical history, and problem list.    I have reviewed and agree with the HPI, ROS, and historical information as entered above. Cheyanne Vasquez MD      /68   Wt 60.3 kg (133 lb)   LMP 2024   BMI 24.33 kg/m²         EXAM:     Prenatal Vitals  BP: 116/68  Weight: 60.3 kg (133 lb)   Fetal Heart Rate: 148      Fundal Height (cm): 28 cm        Urine Glucose Read-only: Negative  Urine Protein Read-only: Negative         Assessment and Plan    Problem List Items Addressed This Visit          Gravid and     History of pre-eclampsia    Overview     G1 2019 38w5d VAVD, got magnesium  Baseline labs within normal limits.  Baseline 24-hour urine 11         Prenatal care, antepartum - Primary    Overview      VAVD 38 weeks Linley 5 pounds 14 ounces.  IOL for pre-e, got mag  3/2021 C/S @ 39w 7#1oz baby boy  elective         Relevant Orders    CBC (No Diff)    Gestational Screen 1 Hr ([facility])    Antibody Screen    RPR, Rfx Qn RPR / Confirm TP    POC Urinalysis Dipstick (Completed)    Tdap Vaccine Greater Than or Equal To 6yo IM (Completed)    Fluzone >6mos ([id number]) (Completed)    Previous  section    Overview     3/2021 C/S @ 39w 7#1oz baby boy elective    Repeat scheduled on 2025 at 7:30 AM            Pregnancy at 28w2d  1 hr Glucola, CBC, RPR. Antibody screen and TDAP today  Fetal movement/PTL or Labor precautions  Activity and Exercise discussed.  Return in about 4 weeks (around 12/10/2024).        Cheyanne Vasquez MD  2024

## 2024-11-13 LAB
BLD GP AB SCN SERPL QL: NEGATIVE
ERYTHROCYTE [DISTWIDTH] IN BLOOD BY AUTOMATED COUNT: 12.2 % (ref 12.3–15.4)
GLUCOSE 1H P 50 G GLC PO SERPL-MCNC: 145 MG/DL (ref 65–139)
HCT VFR BLD AUTO: 32.3 % (ref 34–46.6)
HGB BLD-MCNC: 10.8 G/DL (ref 12–15.9)
MCH RBC QN AUTO: 31 PG (ref 26.6–33)
MCHC RBC AUTO-ENTMCNC: 33.4 G/DL (ref 31.5–35.7)
MCV RBC AUTO: 92.8 FL (ref 79–97)
PLATELET # BLD AUTO: 193 10*3/MM3 (ref 140–450)
RBC # BLD AUTO: 3.48 10*6/MM3 (ref 3.77–5.28)
RPR SER QL: NON REACTIVE
WBC # BLD AUTO: 10.13 10*3/MM3 (ref 3.4–10.8)

## 2024-11-14 ENCOUNTER — TELEPHONE (OUTPATIENT)
Dept: OBSTETRICS AND GYNECOLOGY | Facility: CLINIC | Age: 29
End: 2024-11-14
Payer: COMMERCIAL

## 2024-11-14 ENCOUNTER — ROUTINE PRENATAL (OUTPATIENT)
Dept: OBSTETRICS AND GYNECOLOGY | Facility: CLINIC | Age: 29
End: 2024-11-14
Payer: COMMERCIAL

## 2024-11-14 VITALS — DIASTOLIC BLOOD PRESSURE: 80 MMHG | SYSTOLIC BLOOD PRESSURE: 128 MMHG | BODY MASS INDEX: 24.22 KG/M2 | WEIGHT: 132.4 LBS

## 2024-11-14 DIAGNOSIS — Z34.90 PRENATAL CARE, ANTEPARTUM: ICD-10-CM

## 2024-11-14 DIAGNOSIS — R31.0 GROSS HEMATURIA: ICD-10-CM

## 2024-11-14 DIAGNOSIS — O46.90 VAGINAL BLEEDING IN PREGNANCY: Primary | ICD-10-CM

## 2024-11-14 PROBLEM — R73.09 ELEVATED GLUCOSE TOLERANCE TEST: Status: ACTIVE | Noted: 2024-11-14

## 2024-11-14 PROBLEM — O99.019 ANEMIA IN PREGNANCY: Status: ACTIVE | Noted: 2024-11-14

## 2024-11-14 LAB
BILIRUB BLD-MCNC: NEGATIVE MG/DL
CLARITY, POC: CLEAR
COLOR UR: YELLOW
GLUCOSE UR STRIP-MCNC: NEGATIVE MG/DL
GLUCOSE UR STRIP-MCNC: NEGATIVE MG/DL
KETONES UR QL: NEGATIVE
LEUKOCYTE EST, POC: NEGATIVE
NITRITE UR-MCNC: NEGATIVE MG/ML
PH UR: 6 [PH] (ref 5–8)
PROT UR STRIP-MCNC: ABNORMAL MG/DL
PROT UR STRIP-MCNC: NEGATIVE MG/DL
RBC # UR STRIP: ABNORMAL /UL
SP GR UR: 1.02 (ref 1–1.03)
UROBILINOGEN UR QL: ABNORMAL

## 2024-11-14 RX ORDER — NITROFURANTOIN 25; 75 MG/1; MG/1
100 CAPSULE ORAL 2 TIMES DAILY
Qty: 14 CAPSULE | Refills: 0 | Status: SHIPPED | OUTPATIENT
Start: 2024-11-14 | End: 2024-11-21

## 2024-11-14 NOTE — PROGRESS NOTES
OB FOLLOW UP  CC- Here for care of pregnancy        Nancy Little is a 29 y.o.  28w4d patient being seen today for cramping and bleeding. It started 4 hour(s) ago and has not changed since then. The bleeding was bright red in color and was present in toilet and on tissue after second void of the day. It is currently bright red and is only present with urinating. The patient's blood type is RH Positive. She denies recent intercourse. She has not had a recent cervical check. She does have associated pain. She describes pain as cramping in right lower abdomen.  It does not feel like contractions.       Her prenatal care is complicated by (and status) :    Patient Active Problem List   Diagnosis    Family history of breast cancer in mother    History of pre-eclampsia    Prenatal care, antepartum    Previous  section         Ultrasound Today: Yes.  Findings showed . Placenta posterior, no mention of bleeding. CL 4.9cm. Pending physician review.     ROS -   Patient Reports : Vaginal Spotting and Cramping  Patient Denies: Loss of Fluid, Vision Changes, Headaches, Nausea , Vomiting , Epigastric pain, and skin itching  Fetal Movement : normal  All other systems reviewed and are negative.       The additional following portions of the patient's history were reviewed and updated as appropriate: allergies and current medications.    I have reviewed and agree with the HPI, ROS, and historical information as entered above. SUNIL Alfonso      /80   Wt 60.1 kg (132 lb 6.4 oz)   LMP 2024   BMI 24.22 kg/m²       EXAM:     Prenatal Vitals  BP: 128/80  Weight: 60.1 kg (132 lb 6.4 oz)   Fetal Heart Rate: 146         Vaginal exam, moderate amount of yellow discharge, cervix friable.   Nuswab collected. Cervix closed.   No CVAT. Fundus palpates soft and nontender. Mildly tender right lateral abdomen         Assessment and Plan    Problem List Items Addressed This Visit       Prenatal  care, antepartum    Overview     5/19 VAVD 38 weeks Linley 5 pounds 14 ounces.  IOL for pre-e, got mag  3/2021 C/S @ 39w 7#1oz baby boy elective         Relevant Orders    POC Urinalysis Dipstick (Completed)     Other Visit Diagnoses       Vaginal bleeding in pregnancy    -  Primary    Relevant Orders    POC Urinalysis Dipstick (Completed)    Urine Culture - Urine, Urine, Clean Catch    NuSwab VG, Candida 6sp - Swab, Vagina    Gross hematuria        Relevant Medications    nitrofurantoin, macrocrystal-monohydrate, (Macrobid) 100 MG capsule            Pregnancy at 28w4d  Fetal status reassuring.   Nuswab obtained, will treat if indicated.   US done today. Viable IUP  CCUA positive, will treat with abx and send for culture. Will notify patient of results.  Increase PO fluids  Advised warm tub bath, heating pad to low back only  Return for regularly scheduled OB appointment.  RTC for worsening symptoms  Return for Next scheduled follow up.    Mehnaz Brooks, APRN  11/14/2024

## 2024-11-14 NOTE — TELEPHONE ENCOUNTER
"Patient of Dr. Vasquez;  @ 28w 4d. LOv 24.  Returned patient's call.   Reports onset of mild cramping about an hour ago. Denies any other pain.   States about 30 minutes ago, she went to the bathroom and voided. Noted red blood in the toilet \"like I started my period\".   She checked now; no blood on pad but did have some when she wiped again.   Denies any recent intercourse, strenuous activity, or heavy lifting.   Denies leaking any fluid or contractions.   She takes bASA once daily.  MBT is A positive  Reports active fetal movement.   Discussed with Dr. Vasquez; she recommends patient be seen today with ultrasound. She should go to L&D if bleeding becomes heavy.  Informed patient; she v/u and agreed. Appointment scheduled for this morning.         "

## 2024-11-15 ENCOUNTER — TELEPHONE (OUTPATIENT)
Dept: OBSTETRICS AND GYNECOLOGY | Facility: CLINIC | Age: 29
End: 2024-11-15
Payer: COMMERCIAL

## 2024-11-15 NOTE — TELEPHONE ENCOUNTER
Pt called in has questions and concerns about taking medication in her 3rd trimester   She was advised by the pharmacist to check with provider before taking it.   nitrofurantoin, macrocrystal-monohydrate, (Macrobid) 100 MG capsule (11/14/2024)

## 2024-11-16 LAB
BACTERIA UR CULT: NORMAL
BACTERIA UR CULT: NORMAL

## 2024-11-18 LAB
A VAGINAE DNA VAG QL NAA+PROBE: NORMAL SCORE
BVAB2 DNA VAG QL NAA+PROBE: NORMAL SCORE
C ALBICANS DNA VAG QL NAA+PROBE: NEGATIVE
C GLABRATA DNA VAG QL NAA+PROBE: NEGATIVE
C KRUSEI DNA VAG QL NAA+PROBE: NEGATIVE
C LUSITANIAE DNA VAG QL NAA+PROBE: NEGATIVE
CANDIDA DNA VAG QL NAA+PROBE: NEGATIVE
MEGA1 DNA VAG QL NAA+PROBE: NORMAL SCORE
T VAGINALIS DNA VAG QL NAA+PROBE: NEGATIVE

## 2024-11-20 ENCOUNTER — LAB (OUTPATIENT)
Dept: OBSTETRICS AND GYNECOLOGY | Facility: CLINIC | Age: 29
End: 2024-11-20
Payer: COMMERCIAL

## 2024-11-20 RX ORDER — BLOOD-GLUCOSE METER
KIT MISCELLANEOUS
Qty: 1 EACH | Refills: 0 | Status: SHIPPED | OUTPATIENT
Start: 2024-11-20

## 2024-11-20 RX ORDER — LANCETS 28 GAUGE
EACH MISCELLANEOUS
Qty: 100 EACH | Refills: 2 | Status: SHIPPED | OUTPATIENT
Start: 2024-11-20

## 2024-12-10 ENCOUNTER — ROUTINE PRENATAL (OUTPATIENT)
Dept: OBSTETRICS AND GYNECOLOGY | Facility: CLINIC | Age: 29
End: 2024-12-10
Payer: COMMERCIAL

## 2024-12-10 VITALS — BODY MASS INDEX: 23.92 KG/M2 | SYSTOLIC BLOOD PRESSURE: 104 MMHG | DIASTOLIC BLOOD PRESSURE: 72 MMHG | WEIGHT: 130.8 LBS

## 2024-12-10 DIAGNOSIS — O99.019 ANTEPARTUM ANEMIA: ICD-10-CM

## 2024-12-10 DIAGNOSIS — Z34.90 PRENATAL CARE, ANTEPARTUM: ICD-10-CM

## 2024-12-10 DIAGNOSIS — Z87.59 HISTORY OF PRE-ECLAMPSIA: ICD-10-CM

## 2024-12-10 DIAGNOSIS — Z98.891 PREVIOUS CESAREAN SECTION: ICD-10-CM

## 2024-12-10 DIAGNOSIS — O24.410 DIET CONTROLLED GESTATIONAL DIABETES MELLITUS (GDM), ANTEPARTUM: Primary | ICD-10-CM

## 2024-12-10 LAB
GLUCOSE UR STRIP-MCNC: NEGATIVE MG/DL
PROT UR STRIP-MCNC: NEGATIVE MG/DL

## 2024-12-10 NOTE — PROGRESS NOTES
OB FOLLOW UP  CC- Here for care of pregnancy        Nancy Little is a 29 y.o.  32w2d patient being seen today for her obstetrical follow up visit. Patient reports irregular contractions on .     Pt was unable to tolerate 3hr gtt so she was instructed to check BS until then. Fasting blood sugars range from 72-88, 99 (one time). 2 hr post prandial BS range , 123, 128, 142, 137, 158(chicken sandwich and fries), 162(corndogs),     Pt was seen on  for cramping and bleeding. Nuswab and culture was neg. Pt denies any bleeding and cramping since then.     Her prenatal care is complicated by (and status) :  see below.  Patient Active Problem List   Diagnosis    Family history of breast cancer in mother    History of pre-eclampsia    Prenatal care, antepartum    Previous  section    Anemia in pregnancy    Diet controlled gestational diabetes mellitus (GDM), antepartum       Flu Status: Already given in current flu season  TDAP status: received at last visit  RSV vaccine: given today.  Rhogam status: was not indicated  28 week labs: Reviewed and Recommend 3-hour testing- pt unable to tolerate 3hr she will check BS until next visit.  Ultrasound Today: No  Non Stress Test: No.      ROS -   Patient Denies: Loss of Fluid, Vaginal Spotting, Vision Changes, Headaches, Nausea , Vomiting , Epigastric pain, and skin itching  Fetal Movement : normal  All other systems reviewed and are negative.       The additional following portions of the patient's history were reviewed and updated as appropriate: allergies and current medications.    I have reviewed and agree with the HPI, ROS, and historical information as entered above. Cheyanne Vasquez MD      /72   Wt 59.3 kg (130 lb 12.8 oz)   LMP 2024   BMI 23.92 kg/m²         EXAM:     Prenatal Vitals  BP: 104/72  Weight: 59.3 kg (130 lb 12.8 oz)   Fetal Heart Rate: 142      Fundal Height (cm): 31 cm        Urine Glucose Read-only:  Negative  Urine Protein Read-only: Negative           Assessment and Plan    Problem List Items Addressed This Visit          Endocrine and Metabolic    Diet controlled gestational diabetes mellitus (GDM), antepartum - Primary    Overview     145.  Recommend 3-hour testing- pt unable to tolerate 3hr she will check BS until next visit. Parameters given will call with any elevation  12/10/2024-patient having approximately 5 levels that are outside of range.  Will send to diabetic teaching.         Relevant Orders    Ambulatory Referral to Diabetic Education    US Ob Follow Up Transabdominal Approach    US Fetal Biophysical Profile;Without Non-Stress Testing       Gravid and     History of pre-eclampsia    Overview     G1 2019 38w5d VAVD, got magnesium  Baseline labs within normal limits.  Baseline 24-hour urine 11         Prenatal care, antepartum    Overview      VAVD 38 weeks Linley 5 pounds 14 ounces.  IOL for pre-e, got mag  3/2021 C/S @ 39w 7#1oz baby boy elective         Relevant Orders    POC Urinalysis Dipstick (Completed)    ABRYSVO RSV Vaccine (Adults 60+, pregnant women 32-36 wks) (Completed)    Previous  section    Overview     3/2021 C/S @ 39w 7#1oz baby boy elective    Repeat scheduled on 2025 at 7:30 AM            Hematology and Neoplasia    Anemia in pregnancy    Overview     Hemoglobin 10.8 at 28 weeks.  Advised iron therapy.            Pregnancy at 32w2d  Fetal status reassuring.  28 week labs reviewed.    Activity and Exercise discussed.  Fetal movement/PTL or Labor precautions  Patient is on Prenatal vitamins  Reviewed FSBS goals: fasting <90, 2hr PP < 120  Will send to diabetic teaching.  Patient having abnormal values on screening.  Return in about 2 weeks (around 2024) for ultrasound.    Cheyanne Vasquez MD  12/10/2024

## 2024-12-17 ENCOUNTER — TELEPHONE (OUTPATIENT)
Dept: OBSTETRICS AND GYNECOLOGY | Facility: CLINIC | Age: 29
End: 2024-12-17
Payer: COMMERCIAL

## 2024-12-17 NOTE — TELEPHONE ENCOUNTER
BS goal reviewed with patient. Instructed to monitor diet and keep Diabetes education/nutritionist appt scheduled 12/19/24.

## 2024-12-17 NOTE — TELEPHONE ENCOUNTER
PT is calling because she is watching her blood sugar (she could not tolerate the 3 hour test) she has been taking her BS and she was told to call if she got two high reads and she has gotten two higher reads - 123 Saturday the 14th at dinner and the second one was today at lunch 124 - PT wanted to note both of those days are days she took her iron supplement - lower reads when she does not take iron. PT is following the GD diet -

## 2024-12-19 ENCOUNTER — HOSPITAL ENCOUNTER (OUTPATIENT)
Dept: DIABETES SERVICES | Facility: HOSPITAL | Age: 29
Setting detail: RECURRING SERIES
Discharge: HOME | End: 2024-12-19
Payer: COMMERCIAL

## 2024-12-19 PROCEDURE — G0109 DIAB MANAGE TRN IND/GROUP: HCPCS

## 2024-12-27 ENCOUNTER — ROUTINE PRENATAL (OUTPATIENT)
Dept: OBSTETRICS AND GYNECOLOGY | Facility: CLINIC | Age: 29
End: 2024-12-27
Payer: COMMERCIAL

## 2024-12-27 ENCOUNTER — HOSPITAL ENCOUNTER (OUTPATIENT)
Dept: DIABETES SERVICES | Facility: HOSPITAL | Age: 29
Setting detail: RECURRING SERIES
Discharge: HOME | End: 2024-12-27
Payer: COMMERCIAL

## 2024-12-27 VITALS — WEIGHT: 131.8 LBS | BODY MASS INDEX: 24.11 KG/M2 | SYSTOLIC BLOOD PRESSURE: 102 MMHG | DIASTOLIC BLOOD PRESSURE: 60 MMHG

## 2024-12-27 DIAGNOSIS — Z34.90 PRENATAL CARE, ANTEPARTUM: ICD-10-CM

## 2024-12-27 DIAGNOSIS — Z87.59 HISTORY OF PRE-ECLAMPSIA: ICD-10-CM

## 2024-12-27 DIAGNOSIS — Z98.891 PREVIOUS CESAREAN SECTION: ICD-10-CM

## 2024-12-27 DIAGNOSIS — Z34.83 PRENATAL CARE, SUBSEQUENT PREGNANCY, THIRD TRIMESTER: Primary | ICD-10-CM

## 2024-12-27 DIAGNOSIS — O24.410 DIET CONTROLLED GESTATIONAL DIABETES MELLITUS (GDM), ANTEPARTUM: ICD-10-CM

## 2024-12-27 DIAGNOSIS — O99.019 ANTEPARTUM ANEMIA: ICD-10-CM

## 2024-12-27 LAB
GLUCOSE UR STRIP-MCNC: NEGATIVE MG/DL
PROT UR STRIP-MCNC: ABNORMAL MG/DL

## 2024-12-27 RX ORDER — FERROUS SULFATE 324(65)MG
324 TABLET, DELAYED RELEASE (ENTERIC COATED) ORAL
COMMUNITY

## 2024-12-27 NOTE — PROGRESS NOTES
OB FOLLOW UP  CC- Here for care of pregnancy        Nancy Little is a 29 y.o.  34w5d patient being seen today for her obstetrical follow up visit. Patient reports more acid reflux. Taking Protonix but it is getting worse at night. Bh-ctx for 1 month now, and sinus allergies. Patient reports her fasting bs has been averaging 68-80; 2 HR PP BS: 108/127. Mostly averaging 1teens. Diabetes education completed 2024.     Her prenatal care is complicated by (and status) : see below.  Patient Active Problem List   Diagnosis    Family history of breast cancer in mother    History of pre-eclampsia    Prenatal care, antepartum    Previous  section    Anemia in pregnancy    Diet controlled gestational diabetes mellitus (GDM), antepartum       Flu Status: Already given in current flu season  RSV: already given  Ultrasound Today: Yes. , MIMI 15.8cm, BPP 8/8, AC 30%, EFW 36%. I have reviewed the preliminary US report. Final report pending physician review. Sherie Cuevas, SUNIL    Non Stress Test: No.    ROS -   Patient Denies: Loss of Fluid, Vaginal Spotting, Vision Changes, Headaches, Nausea , Vomiting , Contractions, Epigastric pain, and skin itching  Fetal Movement : normal  All other systems reviewed and are negative.       The additional following portions of the patient's history were reviewed and updated as appropriate: allergies and current medications.    I have reviewed and agree with the HPI, ROS, and historical information as entered above. Sherie Cuevas, APRN      /60   Wt 59.8 kg (131 lb 12.8 oz)   LMP 2024   BMI 24.11 kg/m²       EXAM:     Prenatal Vitals  BP: 102/60  Weight: 59.8 kg (131 lb 12.8 oz)   Fetal Heart Rate: 131           Urine Glucose Read-only: Negative  Urine Protein Read-only: (!) Trace           Assessment and Plan    Problem List Items Addressed This Visit       Anemia in pregnancy    Overview     Hemoglobin 10.8 at 28 weeks.  Advised iron  therapy.         Relevant Medications    ferrous sulfate 324 (65 Fe) MG tablet delayed-release EC tablet    Diet controlled gestational diabetes mellitus (GDM), antepartum    Overview     145.  Recommend 3-hour testing- pt unable to tolerate 3hr she will check BS until next visit. Parameters given will call with any elevation  12/10/2024-patient having approximately 5 levels that are outside of range.  Will send to diabetic teaching.    24- MIMI 15.8cm, AC 30%, EFW 36%.          History of pre-eclampsia    Overview     G1 2019 38w5d VAVD, got magnesium  Baseline labs within normal limits.  Baseline 24-hour urine 11         Prenatal care, antepartum    Overview      VAVD 38 weeks Linley 5 pounds 14 ounces.  IOL for pre-e, got mag  3/2021 C/S @ 39w 7#1oz baby boy elective         Previous  section    Overview     3/2021 C/S @ 39w 7#1oz baby boy elective    Repeat scheduled on 2025 at 7:30 AM          Other Visit Diagnoses       Prenatal care, subsequent pregnancy, third trimester    -  Primary    Relevant Orders    POC Urinalysis Dipstick (Completed)            Pregnancy at 34w5d  Fetal status reassuring.   Activity and Exercise discussed.  Fetal movement/PTL or Labor precautions  Patient is on Prenatal vitamins  Discussed/encouraged social distancing/COVID19 precautions; encouraged vaccination when able  Reviewed FSBS goals: fasting <90, 2hr PP < 120  Reviewed Pre-eclampsia signs/symptoms  GBS next visit  Advised Zyrtec or Claritin and saline nasal spray and/or Flonase for sinus symptoms.  Trace proteinuria- Increase H20 intake to 80oz/day  Increased Protonix from 20mg QD to 40mg QD. Advised to avoid spicy and greasy foods.  Return in about 1 week (around 1/3/2025) for LUCIANA philippe/Christina.    Sherie Cuevas, APRN  2024

## 2024-12-27 NOTE — CONSULTS
This medical referred consult was provided as a telephone call, tele-health or e-visit. Consent for treatment was given verbally.  Patient attended their scheduled 30 minutes diabetes education visit.  Please see media tab for assessment and notes if you use [employer]. If you are not an [employer] user a copy of patient's assessment and notes will be sent per routine. Thank you.

## 2025-01-03 ENCOUNTER — LAB (OUTPATIENT)
Dept: LAB | Facility: HOSPITAL | Age: 30
End: 2025-01-03
Payer: COMMERCIAL

## 2025-01-03 ENCOUNTER — ROUTINE PRENATAL (OUTPATIENT)
Dept: OBSTETRICS AND GYNECOLOGY | Facility: CLINIC | Age: 30
End: 2025-01-03
Payer: COMMERCIAL

## 2025-01-03 VITALS — DIASTOLIC BLOOD PRESSURE: 64 MMHG | BODY MASS INDEX: 24.25 KG/M2 | WEIGHT: 132.6 LBS | SYSTOLIC BLOOD PRESSURE: 102 MMHG

## 2025-01-03 DIAGNOSIS — Z87.59 HISTORY OF PRE-ECLAMPSIA: ICD-10-CM

## 2025-01-03 DIAGNOSIS — Z34.90 PRENATAL CARE, ANTEPARTUM: Primary | ICD-10-CM

## 2025-01-03 DIAGNOSIS — Z98.891 PREVIOUS CESAREAN SECTION: ICD-10-CM

## 2025-01-03 DIAGNOSIS — O24.410 DIET CONTROLLED GESTATIONAL DIABETES MELLITUS (GDM), ANTEPARTUM: ICD-10-CM

## 2025-01-03 LAB
GLUCOSE UR STRIP-MCNC: NEGATIVE MG/DL
PROT UR STRIP-MCNC: NEGATIVE MG/DL

## 2025-01-03 PROCEDURE — 87081 CULTURE SCREEN ONLY: CPT

## 2025-01-03 NOTE — PROGRESS NOTES
OB FOLLOW UP  CC- Here for care of pregnancy        Nancy Little is a 29 y.o.  35w5d patient being seen today for her obstetrical follow up visit. Patient reports no complaints.     Her prenatal care is complicated by (and status) : see below. and GDM diet controlled. Her fasting BG ranging 68-83. Her 2hr PP BG range has been .   Patient Active Problem List   Diagnosis    Family history of breast cancer in mother    History of pre-eclampsia    Prenatal care, antepartum    Previous  section    Anemia in pregnancy    Diet controlled gestational diabetes mellitus (GDM), antepartum       GBS Status: Done Today. She is allergic to PCN. SENSITIVITIES ADDED TO SWAB    Allergies   Allergen Reactions    Penicillins Rash and Hives    Zofran [Ondansetron] Headache          Flu Status: Already given in current flu season  RSV status: already given   Her Delivery Plan is: Repeat . Scheduled  2025   today: no  Non Stress Test: No.    ROS -   Patient Denies: Loss of Fluid, Vaginal Spotting, Vision Changes, Headaches, Nausea , Vomiting , Contractions, Epigastric pain, and skin itching  Fetal Movement : normal  All other systems reviewed and are negative.       The additional following portions of the patient's history were reviewed and updated as appropriate: allergies and current medications.    I have reviewed and agree with the HPI, ROS, and historical information as entered above. Cheyanne Vasquez MD        EXAM:     Prenatal Vitals  BP: 102/64  Weight: 60.1 kg (132 lb 9.6 oz)   Fetal Heart Rate: 138   Fundal Height (cm): 36 cm          Urine Glucose Read-only: Negative  Urine Protein Read-only: Negative           Assessment and Plan    Problem List Items Addressed This Visit          Endocrine and Metabolic    Diet controlled gestational diabetes mellitus (GDM), antepartum    Overview     145.  Recommend 3-hour testing- pt unable to tolerate 3hr she will check BS until next  visit. Parameters given will call with any elevation  12/10/2024-patient having approximately 5 levels that are outside of range.  Will send to diabetic teaching.    24- MIMI 15.8cm, AC 30%, EFW 36%.             Gravid and     History of pre-eclampsia    Overview     G1 2019 38w5d VAVD, got magnesium  Baseline labs within normal limits.  Baseline 24-hour urine 11         Prenatal care, antepartum - Primary    Overview      VAVD 38 weeks Linley 5 pounds 14 ounces.  IOL for pre-e, got mag  3/2021 C/S @ 39w 7#1oz baby boy elective         Relevant Orders    POC Urinalysis Dipstick (Completed)    Group B Streptococcus Culture - Swab, Vaginal/Rectum    Previous  section    Overview     3/2021 C/S @ 39w 7#1oz baby boy elective    Repeat scheduled on 2025 at 7:30 AM            Pregnancy at 35w5d  Fetal status reassuring.   Reviewed FSBS goals: fasting <90, 2hr PP < 120  Reviewed Pre-eclampsia signs/symptoms  GBS today.  Delivery options reviewed with patient  Signs of labor reviewed  Kick counts reviewed  Activity and Exercise discussed.  Return in about 1 week (around 1/10/2025).    Cheyanne Vasquez MD  2025

## 2025-01-06 LAB — BACTERIA SPEC AEROBE CULT: NORMAL

## 2025-01-08 ENCOUNTER — ROUTINE PRENATAL (OUTPATIENT)
Dept: OBSTETRICS AND GYNECOLOGY | Facility: CLINIC | Age: 30
End: 2025-01-08
Payer: COMMERCIAL

## 2025-01-08 VITALS — WEIGHT: 133 LBS | DIASTOLIC BLOOD PRESSURE: 64 MMHG | BODY MASS INDEX: 24.33 KG/M2 | SYSTOLIC BLOOD PRESSURE: 104 MMHG

## 2025-01-08 DIAGNOSIS — Z87.59 HISTORY OF PRE-ECLAMPSIA: ICD-10-CM

## 2025-01-08 DIAGNOSIS — Z98.891 PREVIOUS CESAREAN SECTION: ICD-10-CM

## 2025-01-08 DIAGNOSIS — O99.019 ANTEPARTUM ANEMIA: ICD-10-CM

## 2025-01-08 DIAGNOSIS — Z34.90 PRENATAL CARE, ANTEPARTUM: ICD-10-CM

## 2025-01-08 DIAGNOSIS — O24.410 DIET CONTROLLED GESTATIONAL DIABETES MELLITUS (GDM), ANTEPARTUM: Primary | ICD-10-CM

## 2025-01-08 LAB
GLUCOSE UR STRIP-MCNC: NEGATIVE MG/DL
PROT UR STRIP-MCNC: NEGATIVE MG/DL

## 2025-01-08 NOTE — PROGRESS NOTES
OB FOLLOW UP  CC- Here for care of pregnancy        Nancy Little is a 29 y.o.  36w3d patient being seen today for her obstetrical follow up visit. Patient reports no complaints.     Her prenatal care is complicated by (and status) : see below. and GDM diet controlled. Her fasting BG range has been 70s. Her 2hr PP BG range has been 90s-110.  Patient Active Problem List   Diagnosis    Family history of breast cancer in mother    History of pre-eclampsia    Prenatal care, antepartum    Previous  section    Anemia in pregnancy    Diet controlled gestational diabetes mellitus (GDM), antepartum       GBS Status: was already done and is negative.    Allergies   Allergen Reactions    Penicillins Rash and Hives    Zofran [Ondansetron] Headache          Flu Status: Already given in current flu season  RSV status: already given   Her Delivery Plan is: Repeat . Scheduled  2025   today: no  Non Stress Test: No.    ROS -   Patient Denies: Loss of Fluid, Vaginal Spotting, Vision Changes, Headaches, Nausea , Vomiting , Contractions, Epigastric pain, and skin itching  Fetal Movement : normal  All other systems reviewed and are negative.       The additional following portions of the patient's history were reviewed and updated as appropriate: allergies and current medications.    I have reviewed and agree with the HPI, ROS, and historical information as entered above. Cheyanne Vasquez MD        EXAM:     Prenatal Vitals  BP: 104/64  Weight: 60.3 kg (133 lb)   Fetal Heart Rate: 144   Fundal Height (cm): 36 cm          Urine Glucose Read-only: Negative  Urine Protein Read-only: Negative           Assessment and Plan    Problem List Items Addressed This Visit          Endocrine and Metabolic    Diet controlled gestational diabetes mellitus (GDM), antepartum - Primary    Overview     145.  Recommend 3-hour testing- pt unable to tolerate 3hr she will check BS until next visit. Parameters  given will call with any elevation  12/10/2024-patient having approximately 5 levels that are outside of range.  Will send to diabetic teaching.    24- MIMI 15.8cm, AC 30%, EFW 36%.          Relevant Orders    US Ob Follow Up Transabdominal Approach    US Fetal Biophysical Profile;Without Non-Stress Testing       Gravid and     History of pre-eclampsia    Overview     G1 2019 38w5d VAVD, got magnesium  Baseline labs within normal limits.  Baseline 24-hour urine 11         Prenatal care, antepartum    Overview      VAVD 38 weeks Linley 5 pounds 14 ounces.  IOL for pre-e, got mag  3/2021 C/S @ 39w 7#1oz baby boy elective         Relevant Orders    POC Urinalysis Dipstick (Completed)    Previous  section    Overview     3/2021 C/S @ 39w 7#1oz baby boy elective    Repeat scheduled on 2025 at 7:30 AM            Hematology and Neoplasia    Anemia in pregnancy    Overview     Hemoglobin 10.8 at 28 weeks.  Advised iron therapy.         Relevant Medications    ferrous sulfate 324 (65 Fe) MG tablet delayed-release EC tablet       Pregnancy at 36w3d  Fetal status reassuring.   Reviewed FSBS goals: fasting <90, 2hr PP < 120  Reviewed Pre-eclampsia signs/symptoms  Delivery options reviewed with patient  Signs of labor reviewed  Kick counts reviewed  Activity and Exercise discussed.  Return in about 1 week (around 1/15/2025) for ultrasound.    Cheyanne Vasquez MD  2025

## 2025-01-11 ENCOUNTER — HOSPITAL ENCOUNTER (OUTPATIENT)
Facility: HOSPITAL | Age: 30
Setting detail: SURGERY ADMIT
Discharge: HOME | End: 2025-01-11
Attending: OBSTETRICS & GYNECOLOGY | Admitting: OBSTETRICS & GYNECOLOGY
Payer: COMMERCIAL

## 2025-01-11 VITALS
WEIGHT: 133 LBS | OXYGEN SATURATION: 97 % | DIASTOLIC BLOOD PRESSURE: 89 MMHG | BODY MASS INDEX: 24.48 KG/M2 | SYSTOLIC BLOOD PRESSURE: 118 MMHG | HEIGHT: 62 IN | HEART RATE: 98 BPM | RESPIRATION RATE: 18 BRPM | TEMPERATURE: 98.5 F

## 2025-01-11 LAB
ABO GROUP BLD: NORMAL
ALP SERPL-CCNC: 128 U/L (ref 39–117)
ALT SERPL W P-5'-P-CCNC: 10 U/L (ref 1–33)
AST SERPL-CCNC: 22 U/L (ref 1–32)
BILE AC SERPL-SCNC: 7 UMOL/L (ref 0–10)
BILIRUB SERPL-MCNC: 0.4 MG/DL (ref 0–1.2)
BLD GP AB SCN SERPL QL: NEGATIVE
CREAT SERPL-MCNC: 0.63 MG/DL (ref 0.57–1)
DEPRECATED RDW RBC AUTO: 39.1 FL (ref 37–54)
ERYTHROCYTE [DISTWIDTH] IN BLOOD BY AUTOMATED COUNT: 12.7 % (ref 12.3–15.4)
HCT VFR BLD AUTO: 33.5 % (ref 34–46.6)
HGB BLD-MCNC: 11.3 G/DL (ref 12–15.9)
LDH SERPL-CCNC: 158 U/L (ref 135–214)
MCH RBC QN AUTO: 28.8 PG (ref 26.6–33)
MCHC RBC AUTO-ENTMCNC: 33.7 G/DL (ref 31.5–35.7)
MCV RBC AUTO: 85.2 FL (ref 79–97)
PLATELET # BLD AUTO: 215 10*3/MM3 (ref 140–450)
PMV BLD AUTO: 11 FL (ref 6–12)
RBC # BLD AUTO: 3.93 10*6/MM3 (ref 3.77–5.28)
RH BLD: POSITIVE
T&S EXPIRATION DATE: NORMAL
URATE SERPL-MCNC: 3.8 MG/DL (ref 2.4–5.7)
WBC NRBC COR # BLD AUTO: 9.44 10*3/MM3 (ref 3.4–10.8)

## 2025-01-11 PROCEDURE — G0463 HOSPITAL OUTPT CLINIC VISIT: HCPCS

## 2025-01-11 PROCEDURE — 86901 BLOOD TYPING SEROLOGIC RH(D): CPT | Performed by: OBSTETRICS & GYNECOLOGY

## 2025-01-11 PROCEDURE — 84075 ASSAY ALKALINE PHOSPHATASE: CPT | Performed by: OBSTETRICS & GYNECOLOGY

## 2025-01-11 PROCEDURE — 82239 BILE ACIDS TOTAL: CPT | Performed by: OBSTETRICS & GYNECOLOGY

## 2025-01-11 PROCEDURE — 84460 ALANINE AMINO (ALT) (SGPT): CPT | Performed by: OBSTETRICS & GYNECOLOGY

## 2025-01-11 PROCEDURE — 59025 FETAL NON-STRESS TEST: CPT | Performed by: OBSTETRICS & GYNECOLOGY

## 2025-01-11 PROCEDURE — 84450 TRANSFERASE (AST) (SGOT): CPT | Performed by: OBSTETRICS & GYNECOLOGY

## 2025-01-11 PROCEDURE — 82565 ASSAY OF CREATININE: CPT | Performed by: OBSTETRICS & GYNECOLOGY

## 2025-01-11 PROCEDURE — 86850 RBC ANTIBODY SCREEN: CPT | Performed by: OBSTETRICS & GYNECOLOGY

## 2025-01-11 PROCEDURE — 82247 BILIRUBIN TOTAL: CPT | Performed by: OBSTETRICS & GYNECOLOGY

## 2025-01-11 PROCEDURE — 84550 ASSAY OF BLOOD/URIC ACID: CPT | Performed by: OBSTETRICS & GYNECOLOGY

## 2025-01-11 PROCEDURE — 59025 FETAL NON-STRESS TEST: CPT

## 2025-01-11 PROCEDURE — 86900 BLOOD TYPING SEROLOGIC ABO: CPT | Performed by: OBSTETRICS & GYNECOLOGY

## 2025-01-11 PROCEDURE — 99213 OFFICE O/P EST LOW 20 MIN: CPT | Performed by: OBSTETRICS & GYNECOLOGY

## 2025-01-11 PROCEDURE — 85027 COMPLETE CBC AUTOMATED: CPT | Performed by: OBSTETRICS & GYNECOLOGY

## 2025-01-11 PROCEDURE — 83615 LACTATE (LD) (LDH) ENZYME: CPT | Performed by: OBSTETRICS & GYNECOLOGY

## 2025-01-11 RX ORDER — URSODIOL 300 MG/1
300 CAPSULE ORAL 3 TIMES DAILY
Qty: 30 CAPSULE | Refills: 0 | Status: SHIPPED | OUTPATIENT
Start: 2025-01-11 | End: 2025-01-20

## 2025-01-11 NOTE — DISCHARGE INSTR - LAB
Call Dr Vasquez's office first thing Monday, Jan 13th to have an appointment after being seen in triage and started on actigal

## 2025-01-11 NOTE — H&P
"[facility]  Obstetric History and Physical    Chief Complaint   Patient presents with    Itching       HPI:      Patient is a 29 y.o. female  currently at 36w6d, who presents with a complaint of worsening itching of her palms and soles of her feet.   She says she has the itching of her palms all the time, but the feet is worse at night.    This has been going on for the last 3 days.  She denies headache, vision changes, and RUQ pain.   Denies pregnancy related complaints.   She plans on having a repeat LTCS.  +        The following portions of the patients history were reviewed and updated as appropriate: current medications, allergies, past medical history, past surgical history, past family history, past social history and problem list .       Prenatal Information:   Maternal Prenatal Labs  Blood Type No results found for: \"ABO\"   Rh Status No results found for: \"RH\"   Antibody Screen No results found for: \"ABSCRN\"   Gonnorhea No results found for: \"GCCX\"   Chlamydia No results found for: \"CLAMYDCU\"   RPR No results found for: \"RPR\"   Syphilis Antibody No results found for: \"SYPHILIS\"   Rubella No results found for: \"RUBELLAIGGIN\"   Hepatitis B Surface Antigen No results found for: \"HEPBSAG\"   HIV-1 Antibody No results found for: \"LABHIV1\"   Hepatitis C Antibody No results found for: \"HEPCAB\"   Rapid Urin Drug Screen No results found for: \"AMPMETHU\", \"BARBITSCNUR\", \"LABBENZSCN\", \"LABMETHSCN\", \"LABOPIASCN\", \"THCURSCR\", \"COCAINEUR\", \"AMPHETSCREEN\", \"PROPOXSCN\", \"BUPRENORSCNU\", \"METAMPSCNUR\", \"OXYCODONESCN\", \"TRICYCLICSCN\"   Group B Strep Culture No results found for: \"GBSANTIGEN\"           External Prenatal Results       Pregnancy Outside Results - Transcribed From Office Records - See Scanned Records For Details       Test Value Date Time    ABO  A  07/15/24 1423    Rh  Positive  07/15/24 1423    Antibody Screen  Negative  24 0948       Negative  07/15/24 1423    Varicella IgG       Rubella  4.70 " index 07/15/24 1423    Hgb  11.3 g/dL 25 1156       10.8 g/dL 24 0948       13.1 g/dL 07/15/24 1423    Hct  33.5 % 25 1156       32.3 % 24 0948       39.3 % 07/15/24 1423    HgB A1c        1h GTT  145 mg/dL 24 0948    3h GTT Fasting       3h GTT 1 hour       3h GTT 2 hour       3h GTT 3 hour        Gonorrhea (discrete)  Negative  07/15/24 1423    Chlamydia (discrete)  Negative  07/15/24 1423    RPR  Non Reactive  24 0948       Non Reactive  07/15/24 1423    Syphils cascade: TP-Ab (FTA)       TP-Ab       TP-Ab (EIA)       TPPA       HBsAg  Negative  07/15/24 1423    Herpes Simplex Virus PCR       Herpes Simplex VIrus Culture       HIV  Non Reactive  07/15/24 1423    Hep C RNA Quant PCR       Hep C Antibody  Non Reactive  07/15/24 1423    AFP       NIPT       Cystic Fibrosis (Edith)       Cystic Fibroisis        Spinal Muscular atrophy       Fragile X       Group B Strep  No Group B Streptococcus isolated  25 1748    GBS Susceptibility to Clindamycin       GBS Susceptibility to Erythromycin       Fetal Fibronectin       Genetic Testing, Maternal Blood                 Drug Screening       Test Value Date Time    Urine Drug Screen       Amphetamine Screen       Barbiturate Screen       Benzodiazepine Screen       Methadone Screen       Phencyclidine Screen       Opiates Screen       THC Screen       Cocaine Screen       Propoxyphene Screen       Buprenorphine Screen       Methamphetamine Screen       Oxycodone Screen       Tricyclic Antidepressants Screen                 Legend    ^: Historical                              Past OB History:     OB History    Para Term  AB Living   3 2 2 0 0 2   SAB IAB Ectopic Molar Multiple Live Births   0 0 0 0 0 2      # Outcome Date GA Lbr John/2nd Weight Sex Type Anes PTL Lv   3 Current            2 Term 20 39w0d  3215 g (7 lb 1.4 oz) M CS-LTranv Spinal  MAE      Name: JOSH SMITH      Apgar1: 9  Apgar5: 9   1 Term  19 38w5d 04:00 / 00:29 2655 g (5 lb 13.7 oz) F Vag-Vacuum EPI N MAE      Name: STEPHANIE SMITH      Apgar1: 8  Apgar5: 9       Past Medical History: Past Medical History:   Diagnosis Date    Acid reflux     hx nexium    BRCA1 negative     BRCA2 negative     Eczema     History of pre-eclampsia     Preeclampsia     Tachycardia     after first delivery     Urinary retention       Past Surgical History Past Surgical History:   Procedure Laterality Date     SECTION N/A 3/19/2021    Procedure:  SECTION PRIMARY;  Surgeon: Cheyanne Vasquez MD;  Location:  [facility];  Service: Obstetrics/Gynecology;  Laterality: N/A;    CHEILECTOMY Right 2016    Procedure: RT HALLUX RIGIDUS REPAIR CHONDROPLASTY QIAN OSTEOTOMY ;  Surgeon: Bob Grant MD;  [place]  [facility] [place]  Service:     WISDOM TOOTH EXTRACTION        Family History: Family History   Problem Relation Age of Onset    Leukemia Father     Breast cancer Mother     No Known Problems Sister     Diabetes Maternal Grandfather     Ovarian cancer Neg Hx     Uterine cancer Neg Hx     Colon cancer Neg Hx       Social History:  reports that she has never smoked. She has never used smokeless tobacco.   reports no history of alcohol use.   reports no history of drug use.            Objective     Vital Signs Range for the last 24 hours  Temperature: Temp:  [98.5 °F (36.9 °C)] 98.5 °F (36.9 °C)   Temp Source: Temp src: Oral   BP: BP: (118)/(89) 118/89   Pulse: Heart Rate:  [98] 98   Respirations: Resp:  [18] 18   SPO2: SpO2:  [97 %] 97 %   O2 Amount (l/min):     O2 Devices Device (Oxygen Therapy): room air   Weight: Weight:  [60.3 kg (133 lb)] 60.3 kg (133 lb)     Physical Examination: General appearance - alert, well appearing, and in no distress and oriented to person, place, and time  Chest - Breathing is unlaboured   Heart - Regular rate   Abdomen - soft, nontender, nondistended, gravid uterus   Extremities - pedal edema  - none  -  no rashes     Presentation: Cephalic    Cervix: Exam by: Method: sterile vaginal exam performed   Dilation:  1   Effacement: Cervical Effacement: 60   Station:  0     Fetal Heart Rate Assessment   Method: Fetal HR Assessment Method: external   Beats/min: Fetal HR (beats/min): 130   Baseline: Fetal HR Baseline: normal range   Varibility: Fetal HR Variability: moderate (amplitude range 6 to 25 bpm)   Accels: Fetal HR Accelerations: greater than/equal to 15 bpm, lasting at least 15 seconds   Decels: Fetal HR Decelerations: absent   Tracing Category:       Uterine Assessment   Method: Method: external tocotransducer   Frequency (min): Contraction Frequency (Minutes): 2-3   Ctx Count in 10 min:     Duration:     Intensity:     Intensity by IUPC:     Resting Tone: Uterine Resting Tone: soft by palpation   Resting Tone by IUPC:     Glen Echo Units:      NST                     Indication:  Itching   Start time: 1200                  End time: 1235  15 x 15 accels x 2  Yes  No decels  Baseline  130s   Reactive NST - Yes     Laboratory Results:   Lab Results   Component Value Date     01/11/2025    HGB 11.3 (L) 01/11/2025    HCT 33.5 (L) 01/11/2025    WBC 9.44 01/11/2025      Lab Results   Component Value Date    HGB 11.3 (L) 01/11/2025     01/11/2025    AST 22 01/11/2025    ALT 10 01/11/2025     01/11/2025    URICACID 3.8 01/11/2025    BUN 6 09/17/2020    CREATININE 0.63 01/11/2025    GLUCOSE 92 09/17/2020      Bile Acids:     Pending           Assessment/Plan  1. Intrauterine pregnancy at 36w6d gestation with reactive fetal status  2. Itching of palms and soles - concerning for possible cholestasis of pregnancy.  Will start her on Actigall 300 mg TID while Bile acids result.   Recommend that she calls the office on Monday for sooner follow-up   3.  Given education   4.  Questions answered   5.  D/C home.        Stephane Centeno MD  1/11/2025  12:45 EST

## 2025-01-15 ENCOUNTER — PREP FOR SURGERY (OUTPATIENT)
Dept: OTHER | Facility: HOSPITAL | Age: 30
End: 2025-01-15
Payer: COMMERCIAL

## 2025-01-15 DIAGNOSIS — Z98.891 PREVIOUS CESAREAN SECTION: Primary | ICD-10-CM

## 2025-01-15 PROBLEM — Z34.90 PREGNANCY: Status: ACTIVE | Noted: 2025-01-15

## 2025-01-15 RX ORDER — CARBOPROST TROMETHAMINE 250 UG/ML
250 INJECTION, SOLUTION INTRAMUSCULAR AS NEEDED
OUTPATIENT
Start: 2025-01-15

## 2025-01-15 RX ORDER — MISOPROSTOL 200 UG/1
800 TABLET ORAL AS NEEDED
OUTPATIENT
Start: 2025-01-15

## 2025-01-15 RX ORDER — OXYTOCIN/0.9 % SODIUM CHLORIDE 30/500 ML
650 PLASTIC BAG, INJECTION (ML) INTRAVENOUS ONCE
OUTPATIENT
Start: 2025-01-15 | End: 2025-01-15

## 2025-01-15 RX ORDER — SODIUM CHLORIDE 9 MG/ML
40 INJECTION, SOLUTION INTRAVENOUS AS NEEDED
OUTPATIENT
Start: 2025-01-15

## 2025-01-15 RX ORDER — SODIUM CHLORIDE 0.9 % (FLUSH) 0.9 %
10 SYRINGE (ML) INJECTION AS NEEDED
OUTPATIENT
Start: 2025-01-15

## 2025-01-15 RX ORDER — ACETAMINOPHEN 500 MG
1000 TABLET ORAL ONCE
OUTPATIENT
Start: 2025-01-15 | End: 2025-01-15

## 2025-01-15 RX ORDER — CITRIC ACID/SODIUM CITRATE 334-500MG
30 SOLUTION, ORAL ORAL ONCE
OUTPATIENT
Start: 2025-01-15 | End: 2025-01-15

## 2025-01-15 RX ORDER — LIDOCAINE HYDROCHLORIDE 10 MG/ML
0.5 INJECTION, SOLUTION EPIDURAL; INFILTRATION; INTRACAUDAL; PERINEURAL ONCE AS NEEDED
OUTPATIENT
Start: 2025-01-15

## 2025-01-15 RX ORDER — OXYTOCIN/0.9 % SODIUM CHLORIDE 30/500 ML
85 PLASTIC BAG, INJECTION (ML) INTRAVENOUS ONCE
OUTPATIENT
Start: 2025-01-15 | End: 2025-01-15

## 2025-01-15 RX ORDER — BUPIVACAINE HCL/0.9 % NACL/PF 0.1 %
2 PLASTIC BAG, INJECTION (ML) EPIDURAL ONCE
OUTPATIENT
Start: 2025-01-15 | End: 2025-01-15

## 2025-01-15 RX ORDER — METHYLERGONOVINE MALEATE 0.2 MG/ML
200 INJECTION INTRAVENOUS ONCE AS NEEDED
OUTPATIENT
Start: 2025-01-15

## 2025-01-15 RX ORDER — SODIUM CHLORIDE 0.9 % (FLUSH) 0.9 %
10 SYRINGE (ML) INJECTION EVERY 12 HOURS SCHEDULED
OUTPATIENT
Start: 2025-01-15

## 2025-01-15 RX ORDER — SODIUM CHLORIDE, SODIUM LACTATE, POTASSIUM CHLORIDE, CALCIUM CHLORIDE 600; 310; 30; 20 MG/100ML; MG/100ML; MG/100ML; MG/100ML
125 INJECTION, SOLUTION INTRAVENOUS CONTINUOUS
OUTPATIENT
Start: 2025-01-15 | End: 2025-01-16

## 2025-01-15 RX ORDER — KETOROLAC TROMETHAMINE 30 MG/ML
30 INJECTION, SOLUTION INTRAMUSCULAR; INTRAVENOUS ONCE
OUTPATIENT
Start: 2025-01-15 | End: 2025-01-15

## 2025-01-16 ENCOUNTER — ROUTINE PRENATAL (OUTPATIENT)
Dept: OBSTETRICS AND GYNECOLOGY | Facility: CLINIC | Age: 30
End: 2025-01-16
Payer: COMMERCIAL

## 2025-01-16 VITALS — WEIGHT: 130.8 LBS | DIASTOLIC BLOOD PRESSURE: 62 MMHG | BODY MASS INDEX: 23.92 KG/M2 | SYSTOLIC BLOOD PRESSURE: 100 MMHG

## 2025-01-16 DIAGNOSIS — Z87.59 HISTORY OF PRE-ECLAMPSIA: ICD-10-CM

## 2025-01-16 DIAGNOSIS — O99.019 ANTEPARTUM ANEMIA: ICD-10-CM

## 2025-01-16 DIAGNOSIS — Z34.90 PRENATAL CARE, ANTEPARTUM: ICD-10-CM

## 2025-01-16 DIAGNOSIS — L29.9 ITCHING: ICD-10-CM

## 2025-01-16 DIAGNOSIS — O24.410 DIET CONTROLLED GESTATIONAL DIABETES MELLITUS (GDM), ANTEPARTUM: Primary | ICD-10-CM

## 2025-01-16 DIAGNOSIS — Z98.891 PREVIOUS CESAREAN SECTION: ICD-10-CM

## 2025-01-16 LAB
GLUCOSE UR STRIP-MCNC: NEGATIVE MG/DL
PROT UR STRIP-MCNC: NEGATIVE MG/DL

## 2025-01-16 NOTE — PROGRESS NOTES
OB FOLLOW UP  CC- Here for care of pregnancy        Nancy Little is a 29 y.o.  37w4d patient being seen today for her obstetrical follow up visit. Patient reports occasional BH contractions.      Pt went to [facility] on 2025 for itching that was worsening, actigall was started but the discontinued due to labs being normal (7-bile acids and PEP normal on ).     Pt reports she is still having itching on her palms and bottom of her feet. Pt states it is not as bas as last week but she is still having it. Pt states itching gets worse at night.     Her prenatal care is complicated by (and status) : see below. and GDM diet controlled. Her fasting BG range has been 70s. Her 2hr PP BG range has been 100-111.  Patient Active Problem List   Diagnosis    Family history of breast cancer in mother    History of pre-eclampsia    Prenatal care, antepartum    Previous  section    Anemia in pregnancy    Diet controlled gestational diabetes mellitus (GDM), antepartum    Pregnancy       GBS Status:   Group B Strep Culture   Date Value Ref Range Status   2025 No Group B Streptococcus isolated  Final         Allergies   Allergen Reactions    Penicillins Rash and Hives    Zofran [Ondansetron] Headache          Flu Status: Already given in current flu season  Her Delivery Plan is: Repeat . Scheduled    US done today: Yes.  Findings showed male fetus in vertex presentation with normal fluid volume of with an MIMI of 13.  BPP 8 out of 8..  I have personally evaluated the U/S and agree with the findings. Cheyanne Vasquez MD    Non Stress Test: No.    ROS -   Patient Denies: Loss of Fluid, Vaginal Spotting, Vision Changes, Headaches, Nausea , Vomiting , and Epigastric pain  Fetal Movement : normal  All other systems reviewed and are negative.       The additional following portions of the patient's history were reviewed and updated as appropriate: allergies and current  medications.    I have reviewed and agree with the HPI, ROS, and historical information as entered above. Cheyanne Vasquez MD        EXAM:     Prenatal Vitals  BP: 100/62  Weight: 59.3 kg (130 lb 12.8 oz)   Fetal Heart Rate: 154              Urine Glucose Read-only: Negative  Urine Protein Read-only: Negative           Assessment and Plan    Problem List Items Addressed This Visit          Endocrine and Metabolic    Diet controlled gestational diabetes mellitus (GDM), antepartum - Primary    Overview     145.  Recommend 3-hour testing- pt unable to tolerate 3hr she will check BS until next visit. Parameters given will call with any elevation  12/10/2024-patient having approximately 5 levels that are outside of range.  Will send to diabetic teaching.    24- MIMI 15.8cm, AC 30%, EFW 36%.             Gravid and     History of pre-eclampsia    Overview     G1 2019 38w5d VAVD, got magnesium  Baseline labs within normal limits.  Baseline 24-hour urine 11         Prenatal care, antepartum    Overview      VAVD 38 weeks Linley 5 pounds 14 ounces.  IOL for pre-e, got mag  3/2021 C/S @ 39w 7#1oz baby boy elective         Relevant Orders    POC Urinalysis Dipstick (Completed)    Previous  section    Overview     3/2021 C/S @ 39w 7#1oz baby boy elective    Repeat scheduled on 2025 at 7:30 AM-orders in            Hematology and Neoplasia    Anemia in pregnancy    Overview     Hemoglobin 10.8 at 28 weeks.  Advised iron therapy.         Relevant Medications    ferrous sulfate 324 (65 Fe) MG tablet delayed-release EC tablet     Other Visit Diagnoses       Itching        Relevant Orders    AlP+ALT+AST+Creat+LD+TBili+..    Bile Acids, Total            Pregnancy at 37w4d  Fetal status reassuring.   Reviewed FSBS goals: fasting <90, 2hr PP < 120  Reviewed Pre-eclampsia signs/symptoms  Itching persists.  Will reevaluate with bile acids and LFTs today.  Delivery options reviewed with  patient  Signs of labor reviewed  Kick counts reviewed  Activity and Exercise discussed.  Return in about 1 week (around 1/23/2025).    Cheyanne Vasquez MD  01/16/2025

## 2025-01-17 LAB
ALP SERPL-CCNC: 151 IU/L (ref 44–121)
ALT SERPL-CCNC: 15 IU/L (ref 0–32)
AST SERPL-CCNC: 17 IU/L (ref 0–40)
BASOPHILS # BLD AUTO: 0 X10E3/UL (ref 0–0.2)
BASOPHILS NFR BLD AUTO: 0 %
BILIRUB SERPL-MCNC: 0.3 MG/DL (ref 0–1.2)
CREAT SERPL-MCNC: 0.75 MG/DL (ref 0.57–1)
EGFRCR SERPLBLD CKD-EPI 2021: 110 ML/MIN/1.73
EOSINOPHIL # BLD AUTO: 0.1 X10E3/UL (ref 0–0.4)
EOSINOPHIL NFR BLD AUTO: 1 %
ERYTHROCYTE [DISTWIDTH] IN BLOOD BY AUTOMATED COUNT: 12 % (ref 11.7–15.4)
HCT VFR BLD AUTO: 36.9 % (ref 34–46.6)
HGB BLD-MCNC: 12.1 G/DL (ref 11.1–15.9)
IMM GRANULOCYTES # BLD AUTO: 0.1 X10E3/UL (ref 0–0.1)
IMM GRANULOCYTES NFR BLD AUTO: 1 %
LDH SERPL L TO P-CCNC: 183 IU/L (ref 119–226)
LYMPHOCYTES # BLD AUTO: 2 X10E3/UL (ref 0.7–3.1)
LYMPHOCYTES NFR BLD AUTO: 21 %
MCH RBC QN AUTO: 28.7 PG (ref 26.6–33)
MCHC RBC AUTO-ENTMCNC: 32.8 G/DL (ref 31.5–35.7)
MCV RBC AUTO: 87 FL (ref 79–97)
MONOCYTES # BLD AUTO: 0.5 X10E3/UL (ref 0.1–0.9)
MONOCYTES NFR BLD AUTO: 5 %
NEUTROPHILS # BLD AUTO: 7.1 X10E3/UL (ref 1.4–7)
NEUTROPHILS NFR BLD AUTO: 72 %
PLATELET # BLD AUTO: 216 X10E3/UL (ref 150–450)
RBC # BLD AUTO: 4.22 X10E6/UL (ref 3.77–5.28)
URATE SERPL-MCNC: 4.2 MG/DL (ref 2.6–6.2)
WBC # BLD AUTO: 9.9 X10E3/UL (ref 3.4–10.8)

## 2025-01-18 LAB — BILE AC SERPL-SCNC: 3.2 UMOL/L (ref 0–10)

## 2025-01-21 ENCOUNTER — ROUTINE PRENATAL (OUTPATIENT)
Dept: OBSTETRICS AND GYNECOLOGY | Facility: CLINIC | Age: 30
End: 2025-01-21
Payer: COMMERCIAL

## 2025-01-21 VITALS — WEIGHT: 131 LBS | SYSTOLIC BLOOD PRESSURE: 108 MMHG | BODY MASS INDEX: 23.96 KG/M2 | DIASTOLIC BLOOD PRESSURE: 72 MMHG

## 2025-01-21 DIAGNOSIS — Z34.90 PRENATAL CARE, ANTEPARTUM: ICD-10-CM

## 2025-01-21 DIAGNOSIS — Z87.59 HISTORY OF PRE-ECLAMPSIA: ICD-10-CM

## 2025-01-21 DIAGNOSIS — O24.410 DIET CONTROLLED GESTATIONAL DIABETES MELLITUS (GDM), ANTEPARTUM: Primary | ICD-10-CM

## 2025-01-21 DIAGNOSIS — Z98.891 PREVIOUS CESAREAN SECTION: ICD-10-CM

## 2025-01-21 DIAGNOSIS — O99.013 ANEMIA DURING PREGNANCY IN THIRD TRIMESTER: ICD-10-CM

## 2025-01-21 LAB
GLUCOSE UR STRIP-MCNC: NEGATIVE MG/DL
PROT UR STRIP-MCNC: NEGATIVE MG/DL

## 2025-01-21 NOTE — PROGRESS NOTES
OB FOLLOW UP  CC- Here for care of pregnancy        Nancy Little is a 29 y.o.  38w2d patient being seen today for her obstetrical follow up visit. Patient reports irregular ctx. Reports itching is improved as well.    Her prenatal care is complicated by (and status) : see below. and GDM diet controlled. Her fasting BG range has been 70s. Her 2hr PP BG average has been 110s.  Patient Active Problem List   Diagnosis    Family history of breast cancer in mother    History of pre-eclampsia    Prenatal care, antepartum    Previous  section    Anemia in pregnancy    Diet controlled gestational diabetes mellitus (GDM), antepartum    Pregnancy       GBS Status:   Group B Strep Culture   Date Value Ref Range Status   2025 No Group B Streptococcus isolated  Final         Allergies   Allergen Reactions    Penicillins Rash and Hives    Zofran [Ondansetron] Headache          Flu Status: Already given in current flu season  Her Delivery Plan is: Repeat . Scheduled   @ 0730  US today: no  Non Stress Test: No.    ROS -   Patient Denies: Loss of Fluid, Vaginal Spotting, Vision Changes, and Headaches  Fetal Movement : normal  All other systems reviewed and are negative.       The additional following portions of the patient's history were reviewed and updated as appropriate: allergies, current medications, past family history, past medical history, past social history, past surgical history, and problem list.    I have reviewed and agree with the HPI, ROS, and historical information as entered above. Cheyanne Vasquez MD        EXAM:     Prenatal Vitals  BP: 108/72  Weight: 59.4 kg (131 lb)   Fetal Heart Rate: 125   Fundal Height (cm): 38 cm          Urine Glucose Read-only: Negative  Urine Protein Read-only: Negative           Assessment and Plan    Problem List Items Addressed This Visit          Endocrine and Metabolic    Diet controlled gestational diabetes mellitus (GDM),  antepartum - Primary    Overview     145.  Recommend 3-hour testing- pt unable to tolerate 3hr she will check BS until next visit. Parameters given will call with any elevation  12/10/2024-patient having approximately 5 levels that are outside of range.  Will send to diabetic teaching.    24- MIMI 15.8cm, AC 30%, EFW 36%.             Gravid and     History of pre-eclampsia    Overview     G1 2019 38w5d VAVD, got magnesium  Baseline labs within normal limits.  Baseline 24-hour urine 11         Prenatal care, antepartum    Overview      VAVD 38 weeks Linley 5 pounds 14 ounces.  IOL for pre-e, got mag  3/2021 C/S @ 39w 7#1oz baby boy elective         Relevant Orders    POC Urinalysis Dipstick (Completed)    Previous  section    Overview     3/2021 C/S @ 39w 7#1oz baby boy elective    Repeat scheduled on 2025 at 7:30 AM-orders in            Hematology and Neoplasia    Anemia in pregnancy    Overview     Hemoglobin 10.8 at 28 weeks.  Advised iron therapy.         Relevant Medications    ferrous sulfate 324 (65 Fe) MG tablet delayed-release EC tablet       Pregnancy at 38w2d  Fetal status reassuring.   Reviewed Pre-eclampsia signs/symptoms  Reviewed upcoming c/s and PAT instructions with patient. Arrival time and NPO status reviewed.   Delivery options reviewed with patient  Signs of labor reviewed  Kick counts reviewed  Activity and Exercise discussed.  Return in about 3 weeks (around 2025) for with NP for incision check.    Cheyanne Vasquez MD  2025

## 2025-01-24 ENCOUNTER — PRE-ADMISSION TESTING (OUTPATIENT)
Dept: PREADMISSION TESTING | Facility: HOSPITAL | Age: 30
End: 2025-01-24
Payer: COMMERCIAL

## 2025-01-24 VITALS — HEIGHT: 60 IN | WEIGHT: 130.4 LBS | BODY MASS INDEX: 25.6 KG/M2

## 2025-01-24 DIAGNOSIS — Z98.891 PREVIOUS CESAREAN SECTION: ICD-10-CM

## 2025-01-24 LAB
ABO GROUP BLD: NORMAL
BLD GP AB SCN SERPL QL: NEGATIVE
DEPRECATED RDW RBC AUTO: 40.1 FL (ref 37–54)
ERYTHROCYTE [DISTWIDTH] IN BLOOD BY AUTOMATED COUNT: 12.6 % (ref 12.3–15.4)
HCT VFR BLD AUTO: 35.8 % (ref 34–46.6)
HGB BLD-MCNC: 11.8 G/DL (ref 12–15.9)
MCH RBC QN AUTO: 28.8 PG (ref 26.6–33)
MCHC RBC AUTO-ENTMCNC: 33 G/DL (ref 31.5–35.7)
MCV RBC AUTO: 87.3 FL (ref 79–97)
PLATELET # BLD AUTO: 192 10*3/MM3 (ref 140–450)
PMV BLD AUTO: 11.8 FL (ref 6–12)
RBC # BLD AUTO: 4.1 10*6/MM3 (ref 3.77–5.28)
RH BLD: POSITIVE
T&S EXPIRATION DATE: NORMAL
TREPONEMA PALLIDUM IGG+IGM AB [PRESENCE] IN SERUM OR PLASMA BY IMMUNOASSAY: NORMAL
WBC NRBC COR # BLD AUTO: 8.13 10*3/MM3 (ref 3.4–10.8)

## 2025-01-24 PROCEDURE — 86900 BLOOD TYPING SEROLOGIC ABO: CPT

## 2025-01-24 PROCEDURE — 86901 BLOOD TYPING SEROLOGIC RH(D): CPT

## 2025-01-24 PROCEDURE — 85027 COMPLETE CBC AUTOMATED: CPT

## 2025-01-24 PROCEDURE — 86780 TREPONEMA PALLIDUM: CPT | Performed by: OBSTETRICS & GYNECOLOGY

## 2025-01-24 PROCEDURE — 86850 RBC ANTIBODY SCREEN: CPT

## 2025-01-24 PROCEDURE — 36415 COLL VENOUS BLD VENIPUNCTURE: CPT

## 2025-01-24 NOTE — PAT
An arrival time for procedure was not provided during PAT visit. If patient had any questions or concerns about their arrival time, they were instructed to contact their surgeon/physician.  Additionally, if the patient referred to an arrival time that was acquired from their my chart account, patient was encouraged to verify that time with their surgeon/physician. Arrival times are NOT provided in Pre Admission Testing Department.    Patient viewed general PAT education video as instructed in their preoperative information received from their surgeon.  Patient stated the general PAT education video was viewed in its entirety and survey completed.  Copies of PAT general education handouts (Incentive Spirometry, Meds to Beds Program, Patient Belongings, Pre-op skin preparation instructions, Blood Glucose testing, Visitor policy, Surgery FAQ, Code H) distributed to patient if not printed. Education related to the PAT pass and skin preparation for surgery (if applicable) completed in PAT as a reinforcement to PAT education video. Patient instructed to return PAT pass provided today as well as completed skin preparation sheet (if applicable) on the day of procedure.     Additionally if patient had not viewed video yet but intended to view it at home or in our waiting area, then referred them to the handout with QR code/link provided during PAT visit.  Instructed patient to complete survey after viewing the video in its entirety.  Encouraged patient/family to read PAT general education handouts thoroughly and notify PAT staff with any questions or concerns. Patient verbalized understanding of all information and priority content.    Patient denies any current skin issues.     Patient to apply Chlorhexadine wipes  to surgical area (as instructed) the night before procedure and the AM of procedure. Wipes provided.    Instructed patient to take two Tylenol extra strength (total of 1000 mg) the night before  surgery.    Patient instructed to drink 20 ounces of Gatorade or Gatorlyte (if diabetic) and it needs to be completed 1 hour (for Main OR patients) or 2 hours (scheduled  section & [facility] patients) before given arrival time for procedure (NO RED Gatorade and NO Gatorade Zero).    Patient verbalized understanding.    Blood bank bracelet applied to patient during Pre Admission Testing visit.  Patient instructed not to remove from arm until after procedure and they are discharged from the hospital.  Explained to patient that they may shower and get the bracelet wet, but not to immerse under water for longer periods (bathing, swimming, hand dishwashing, etc).  Patient verbalized understanding.

## 2025-01-24 NOTE — DISCHARGE INSTRUCTIONS
What to know before your arrive:    -Do not eat, drink or chew gum beginning 8 hours before your scheduled arrival time to the hospital.  Except please drink 20 ounces of Gatorade and complete two hours before your given arrival time to the hospital.  If you drink too close to surgery time, your sugery may  be delayed or cancelled.  Please complete as instructed.     -Do not shave any part of your body including abdomen or pelvic are for two days before your procedure.  -If you are taking a scheduled medication (insulin, blood pressure medicine,antibiotics) please consult with your physician whether to take on the day of surgery.  -Remove all jewelry including rings, wedding bands, and piercing before coming to the hospital.  -Leave important valuables at home.  -Do not wear dark fingernail polish.  -Please take two Tylenol 500 mg tablets the evening before surgery.  -Bring the following with you to the hospital:    -Picture ID and insurance, Medicare or Medicaid cards    -Co-pay/deductible required by insurance (Cash, Check, Credit Card)    -Copy of living will or power  document (if applicable)    -CPAP mask and tubing, not machine (if applicable)    -Skin prep instructions sheet    What to know the day of procedure:    -Park in the [facility], take elevator for first floor, exit to the right and  proceed through the doors to outside, follow the covered sidewalk to the entrance of the [place], [facility] and [facility], enter the [place] to your right BEFORE entering the [address] lobby.  Take the elevators to the 3rd floor ([address] [place]).  -Leave unnecessary items in your vehicle, including your suitcase.  Your support  person or a family member can get it for you after your procedure.   -Check in at the reception desk in the lobby of the 3rd floor ([address] [place]).   -One person may accompany you to the pre-op/recovery area.  Please have  other family members wait in the  waiting room.   -An anesthesiologist will meet with your prior to your procedure.   -After anesthesia has been initiated, one person may accompany you in the  operating room.   -No video cameras are permitted in the operating room; only still cameras,  Please.      What to expect while you are in recovery:     -One person may stay with you while you are in recovery.   -If the baby is stable, he/she may visit to initiate breastfeeding & [facility].      CHLORHEXIDINE GLUCONATE WIPES AND INSTRUCTIONS GIVEN TO PATIENT

## 2025-01-27 ENCOUNTER — ANESTHESIA (OUTPATIENT)
Dept: LABOR AND DELIVERY | Facility: HOSPITAL | Age: 30
End: 2025-01-27
Payer: COMMERCIAL

## 2025-01-27 ENCOUNTER — ANESTHESIA EVENT (OUTPATIENT)
Dept: LABOR AND DELIVERY | Facility: HOSPITAL | Age: 30
End: 2025-01-27
Payer: COMMERCIAL

## 2025-01-27 ENCOUNTER — HOSPITAL ENCOUNTER (INPATIENT)
Facility: HOSPITAL | Age: 30
LOS: 3 days | Discharge: HOME | End: 2025-01-30
Attending: OBSTETRICS & GYNECOLOGY | Admitting: OBSTETRICS & GYNECOLOGY
Payer: COMMERCIAL

## 2025-01-27 LAB
ATMOSPHERIC PRESS: ABNORMAL MM[HG]
ATMOSPHERIC PRESS: ABNORMAL MM[HG]
BASE EXCESS BLDCOA CALC-SCNC: -6.2 MMOL/L (ref 0–2)
BASE EXCESS BLDCOV CALC-SCNC: -3.7 MMOL/L (ref 0–2)
BDY SITE: ABNORMAL
BDY SITE: ABNORMAL
BODY TEMPERATURE: 37
BODY TEMPERATURE: 37
CO2 BLDA-SCNC: 25.5 MMOL/L (ref 22–33)
CO2 BLDA-SCNC: 25.7 MMOL/L (ref 22–33)
EPAP: 0
EPAP: 0
GLUCOSE BLDC GLUCOMTR-MCNC: 67 MG/DL (ref 70–130)
GLUCOSE BLDC GLUCOMTR-MCNC: 76 MG/DL (ref 70–130)
HCO3 BLDCOA-SCNC: 23.6 MMOL/L (ref 16.9–20.5)
HCO3 BLDCOV-SCNC: 24.1 MMOL/L (ref 18.6–21.4)
HGB BLDA-MCNC: 17.7 G/DL (ref 14–18)
HGB BLDA-MCNC: 17.8 G/DL (ref 14–18)
INHALED O2 CONCENTRATION: 21 %
INHALED O2 CONCENTRATION: 21 %
IPAP: 0
IPAP: 0
Lab: ABNORMAL
MODALITY: ABNORMAL
MODALITY: ABNORMAL
NOTE: 0
NOTIFIED BY: ABNORMAL
NOTIFIED WHO: ABNORMAL
PAW @ PEAK INSP FLOW SETTING VENT: 0 CMH2O
PAW @ PEAK INSP FLOW SETTING VENT: 0 CMH2O
PCO2 BLDCOA: 62.2 MMHG (ref 43.3–54.9)
PCO2 BLDCOV: 52.1 MM HG (ref 28–40)
PH BLDCOA: 7.19 PH UNITS (ref 7.22–7.3)
PH BLDCOV: 7.27 PH UNITS (ref 7.31–7.37)
PO2 BLDCOA: 12.3 MMHG (ref 11.5–43.3)
PO2 BLDCOV: 10.7 MM HG (ref 21–31)
SAO2 % BLDCOA: 15.3 %
SAO2 % BLDCOA: ABNORMAL %
SAO2 % BLDCOV: 12.8 %
TOTAL RATE: 0 BREATHS/MINUTE
TOTAL RATE: 0 BREATHS/MINUTE

## 2025-01-27 PROCEDURE — 25810000003 LACTATED RINGERS PER 1000 ML: Performed by: OBSTETRICS & GYNECOLOGY

## 2025-01-27 PROCEDURE — 25010000002 CEFAZOLIN PER 500 MG: Performed by: OBSTETRICS & GYNECOLOGY

## 2025-01-27 PROCEDURE — 82805 BLOOD GASES W/O2 SATURATION: CPT

## 2025-01-27 PROCEDURE — 25010000002 PROCHLORPERAZINE 10 MG/2ML SOLUTION: Performed by: NURSE ANESTHETIST, CERTIFIED REGISTERED

## 2025-01-27 PROCEDURE — 25010000002 BUPIVACAINE IN DEXTROSE 0.75-8.25 % SOLUTION: Performed by: NURSE ANESTHETIST, CERTIFIED REGISTERED

## 2025-01-27 PROCEDURE — 94799 UNLISTED PULMONARY SVC/PX: CPT

## 2025-01-27 PROCEDURE — 25010000002 MIDAZOLAM PER 1 MG: Performed by: NURSE ANESTHETIST, CERTIFIED REGISTERED

## 2025-01-27 PROCEDURE — 59025 FETAL NON-STRESS TEST: CPT

## 2025-01-27 PROCEDURE — C1765 ADHESION BARRIER: HCPCS | Performed by: OBSTETRICS & GYNECOLOGY

## 2025-01-27 PROCEDURE — 25010000002 METOCLOPRAMIDE PER 10 MG: Performed by: NURSE ANESTHETIST, CERTIFIED REGISTERED

## 2025-01-27 PROCEDURE — 25010000002 KETOROLAC TROMETHAMINE PER 15 MG: Performed by: OBSTETRICS & GYNECOLOGY

## 2025-01-27 PROCEDURE — 25010000002 MORPHINE PER 10 MG: Performed by: NURSE ANESTHETIST, CERTIFIED REGISTERED

## 2025-01-27 PROCEDURE — 25010000002 METHYLERGONOVINE MALEATE PER 0.2 MG: Performed by: NURSE ANESTHETIST, CERTIFIED REGISTERED

## 2025-01-27 PROCEDURE — 82948 REAGENT STRIP/BLOOD GLUCOSE: CPT

## 2025-01-27 PROCEDURE — 59510 CESAREAN DELIVERY: CPT | Performed by: OBSTETRICS & GYNECOLOGY

## 2025-01-27 PROCEDURE — 51703 INSERT BLADDER CATH COMPLEX: CPT

## 2025-01-27 PROCEDURE — 25810000003 LACTATED RINGERS SOLUTION: Performed by: OBSTETRICS & GYNECOLOGY

## 2025-01-27 PROCEDURE — 25010000002 FENTANYL CITRATE (PF) 100 MCG/2ML SOLUTION: Performed by: NURSE ANESTHETIST, CERTIFIED REGISTERED

## 2025-01-27 DEVICE — ABSORBABLE ADHESION BARRIER
Type: IMPLANTABLE DEVICE | Site: ABDOMEN | Status: FUNCTIONAL
Brand: GYNECARE INTERCEED

## 2025-01-27 RX ORDER — PROMETHAZINE HYDROCHLORIDE 25 MG/1
25 TABLET ORAL EVERY 6 HOURS PRN
Status: DISCONTINUED | OUTPATIENT
Start: 2025-01-27 | End: 2025-01-30 | Stop reason: HOSPADM

## 2025-01-27 RX ORDER — HYDROCORTISONE 25 MG/G
1 CREAM TOPICAL AS NEEDED
Status: DISCONTINUED | OUTPATIENT
Start: 2025-01-27 | End: 2025-01-30 | Stop reason: HOSPADM

## 2025-01-27 RX ORDER — SODIUM CHLORIDE 9 MG/ML
40 INJECTION, SOLUTION INTRAVENOUS AS NEEDED
Status: DISCONTINUED | OUTPATIENT
Start: 2025-01-27 | End: 2025-01-27 | Stop reason: HOSPADM

## 2025-01-27 RX ORDER — METHYLERGONOVINE MALEATE 0.2 MG/ML
200 INJECTION INTRAVENOUS AS NEEDED
Status: DISCONTINUED | OUTPATIENT
Start: 2025-01-27 | End: 2025-01-30 | Stop reason: HOSPADM

## 2025-01-27 RX ORDER — OXYCODONE HYDROCHLORIDE 5 MG/1
5 TABLET ORAL EVERY 4 HOURS PRN
Status: DISCONTINUED | OUTPATIENT
Start: 2025-01-27 | End: 2025-01-30 | Stop reason: HOSPADM

## 2025-01-27 RX ORDER — KETOROLAC TROMETHAMINE 30 MG/ML
30 INJECTION, SOLUTION INTRAMUSCULAR; INTRAVENOUS ONCE
Status: COMPLETED | OUTPATIENT
Start: 2025-01-27 | End: 2025-01-27

## 2025-01-27 RX ORDER — ACETAMINOPHEN 325 MG/1
650 TABLET ORAL EVERY 6 HOURS
Status: DISCONTINUED | OUTPATIENT
Start: 2025-01-28 | End: 2025-01-30 | Stop reason: HOSPADM

## 2025-01-27 RX ORDER — CARBOPROST TROMETHAMINE 250 UG/ML
250 INJECTION, SOLUTION INTRAMUSCULAR AS NEEDED
Status: DISCONTINUED | OUTPATIENT
Start: 2025-01-27 | End: 2025-01-27 | Stop reason: HOSPADM

## 2025-01-27 RX ORDER — SODIUM CHLORIDE 0.9 % (FLUSH) 0.9 %
10 SYRINGE (ML) INJECTION AS NEEDED
Status: DISCONTINUED | OUTPATIENT
Start: 2025-01-27 | End: 2025-01-27 | Stop reason: HOSPADM

## 2025-01-27 RX ORDER — IBUPROFEN 600 MG/1
600 TABLET, FILM COATED ORAL EVERY 6 HOURS
Status: DISCONTINUED | OUTPATIENT
Start: 2025-01-28 | End: 2025-01-30 | Stop reason: HOSPADM

## 2025-01-27 RX ORDER — SODIUM CHLORIDE 0.9 % (FLUSH) 0.9 %
10 SYRINGE (ML) INJECTION EVERY 12 HOURS SCHEDULED
Status: DISCONTINUED | OUTPATIENT
Start: 2025-01-27 | End: 2025-01-27 | Stop reason: HOSPADM

## 2025-01-27 RX ORDER — MISOPROSTOL 200 UG/1
800 TABLET ORAL AS NEEDED
Status: DISCONTINUED | OUTPATIENT
Start: 2025-01-27 | End: 2025-01-27 | Stop reason: HOSPADM

## 2025-01-27 RX ORDER — DOCUSATE SODIUM 100 MG/1
100 CAPSULE, LIQUID FILLED ORAL 2 TIMES DAILY PRN
Status: DISCONTINUED | OUTPATIENT
Start: 2025-01-27 | End: 2025-01-28

## 2025-01-27 RX ORDER — OXYTOCIN/0.9 % SODIUM CHLORIDE 30/500 ML
650 PLASTIC BAG, INJECTION (ML) INTRAVENOUS ONCE
Status: COMPLETED | OUTPATIENT
Start: 2025-01-27 | End: 2025-01-27

## 2025-01-27 RX ORDER — ALUMINA, MAGNESIA, AND SIMETHICONE 2400; 2400; 240 MG/30ML; MG/30ML; MG/30ML
15 SUSPENSION ORAL EVERY 4 HOURS PRN
Status: DISCONTINUED | OUTPATIENT
Start: 2025-01-27 | End: 2025-01-30 | Stop reason: HOSPADM

## 2025-01-27 RX ORDER — DIPHENHYDRAMINE HCL 25 MG
25 CAPSULE ORAL EVERY 4 HOURS PRN
Status: DISCONTINUED | OUTPATIENT
Start: 2025-01-27 | End: 2025-01-27

## 2025-01-27 RX ORDER — METOCLOPRAMIDE HYDROCHLORIDE 5 MG/ML
INJECTION INTRAMUSCULAR; INTRAVENOUS AS NEEDED
Status: DISCONTINUED | OUTPATIENT
Start: 2025-01-27 | End: 2025-01-27 | Stop reason: SURG

## 2025-01-27 RX ORDER — ONDANSETRON 4 MG/1
4 TABLET, ORALLY DISINTEGRATING ORAL EVERY 8 HOURS PRN
Status: DISCONTINUED | OUTPATIENT
Start: 2025-01-27 | End: 2025-01-30 | Stop reason: HOSPADM

## 2025-01-27 RX ORDER — PROMETHAZINE HYDROCHLORIDE 12.5 MG/1
12.5 SUPPOSITORY RECTAL EVERY 6 HOURS PRN
Status: DISCONTINUED | OUTPATIENT
Start: 2025-01-27 | End: 2025-01-30 | Stop reason: HOSPADM

## 2025-01-27 RX ORDER — CARBOPROST TROMETHAMINE 250 UG/ML
250 INJECTION, SOLUTION INTRAMUSCULAR AS NEEDED
Status: DISCONTINUED | OUTPATIENT
Start: 2025-01-27 | End: 2025-01-30 | Stop reason: HOSPADM

## 2025-01-27 RX ORDER — SCOPOLAMINE 1 MG/3D
1 PATCH, EXTENDED RELEASE TRANSDERMAL
Status: DISCONTINUED | OUTPATIENT
Start: 2025-01-27 | End: 2025-01-27

## 2025-01-27 RX ORDER — DIPHENHYDRAMINE HYDROCHLORIDE 50 MG/ML
25 INJECTION INTRAMUSCULAR; INTRAVENOUS ONCE AS NEEDED
Status: DISCONTINUED | OUTPATIENT
Start: 2025-01-27 | End: 2025-01-27

## 2025-01-27 RX ORDER — FENTANYL CITRATE 50 UG/ML
INJECTION, SOLUTION INTRAMUSCULAR; INTRAVENOUS AS NEEDED
Status: DISCONTINUED | OUTPATIENT
Start: 2025-01-27 | End: 2025-01-27 | Stop reason: SURG

## 2025-01-27 RX ORDER — SODIUM CHLORIDE 0.9 % (FLUSH) 0.9 %
1-10 SYRINGE (ML) INJECTION AS NEEDED
Status: DISCONTINUED | OUTPATIENT
Start: 2025-01-27 | End: 2025-01-30 | Stop reason: HOSPADM

## 2025-01-27 RX ORDER — MISOPROSTOL 200 UG/1
600 TABLET ORAL AS NEEDED
Status: DISCONTINUED | OUTPATIENT
Start: 2025-01-27 | End: 2025-01-30 | Stop reason: HOSPADM

## 2025-01-27 RX ORDER — OXYCODONE HYDROCHLORIDE 10 MG/1
10 TABLET ORAL EVERY 4 HOURS PRN
Status: DISCONTINUED | OUTPATIENT
Start: 2025-01-27 | End: 2025-01-30 | Stop reason: HOSPADM

## 2025-01-27 RX ORDER — SODIUM CHLORIDE 9 MG/ML
40 INJECTION, SOLUTION INTRAVENOUS AS NEEDED
Status: DISCONTINUED | OUTPATIENT
Start: 2025-01-27 | End: 2025-01-30 | Stop reason: HOSPADM

## 2025-01-27 RX ORDER — ACETAMINOPHEN 500 MG
1000 TABLET ORAL ONCE
Status: COMPLETED | OUTPATIENT
Start: 2025-01-27 | End: 2025-01-27

## 2025-01-27 RX ORDER — METHYLERGONOVINE MALEATE 0.2 MG/ML
200 INJECTION INTRAVENOUS ONCE AS NEEDED
Status: DISCONTINUED | OUTPATIENT
Start: 2025-01-27 | End: 2025-01-27 | Stop reason: HOSPADM

## 2025-01-27 RX ORDER — LIDOCAINE HYDROCHLORIDE 10 MG/ML
0.5 INJECTION, SOLUTION EPIDURAL; INFILTRATION; INTRACAUDAL; PERINEURAL ONCE AS NEEDED
Status: DISCONTINUED | OUTPATIENT
Start: 2025-01-27 | End: 2025-01-27 | Stop reason: HOSPADM

## 2025-01-27 RX ORDER — CALCIUM CARBONATE 500 MG/1
1 TABLET, CHEWABLE ORAL EVERY 4 HOURS PRN
Status: DISCONTINUED | OUTPATIENT
Start: 2025-01-27 | End: 2025-01-30 | Stop reason: HOSPADM

## 2025-01-27 RX ORDER — METHYLERGONOVINE MALEATE 0.2 MG/ML
INJECTION INTRAVENOUS AS NEEDED
Status: DISCONTINUED | OUTPATIENT
Start: 2025-01-27 | End: 2025-01-27 | Stop reason: SURG

## 2025-01-27 RX ORDER — SODIUM CHLORIDE 0.9 % (FLUSH) 0.9 %
10 SYRINGE (ML) INJECTION EVERY 12 HOURS SCHEDULED
Status: DISCONTINUED | OUTPATIENT
Start: 2025-01-27 | End: 2025-01-30 | Stop reason: HOSPADM

## 2025-01-27 RX ORDER — SODIUM CHLORIDE, SODIUM LACTATE, POTASSIUM CHLORIDE, CALCIUM CHLORIDE 600; 310; 30; 20 MG/100ML; MG/100ML; MG/100ML; MG/100ML
125 INJECTION, SOLUTION INTRAVENOUS CONTINUOUS
Status: DISCONTINUED | OUTPATIENT
Start: 2025-01-27 | End: 2025-01-27

## 2025-01-27 RX ORDER — KETOROLAC TROMETHAMINE 15 MG/ML
15 INJECTION, SOLUTION INTRAMUSCULAR; INTRAVENOUS EVERY 6 HOURS
Status: COMPLETED | OUTPATIENT
Start: 2025-01-27 | End: 2025-01-28

## 2025-01-27 RX ORDER — METHYLERGONOVINE MALEATE 0.2 MG/ML
INJECTION INTRAVENOUS
Status: COMPLETED
Start: 2025-01-27 | End: 2025-01-27

## 2025-01-27 RX ORDER — ACETAMINOPHEN 500 MG
1000 TABLET ORAL EVERY 6 HOURS
Status: COMPLETED | OUTPATIENT
Start: 2025-01-27 | End: 2025-01-28

## 2025-01-27 RX ORDER — OXYTOCIN/0.9 % SODIUM CHLORIDE 30/500 ML
85 PLASTIC BAG, INJECTION (ML) INTRAVENOUS ONCE
Status: DISCONTINUED | OUTPATIENT
Start: 2025-01-27 | End: 2025-01-27 | Stop reason: HOSPADM

## 2025-01-27 RX ORDER — NALOXONE HCL 0.4 MG/ML
0.4 VIAL (ML) INJECTION
Status: DISCONTINUED | OUTPATIENT
Start: 2025-01-27 | End: 2025-01-27

## 2025-01-27 RX ORDER — PRENATAL VIT/IRON FUM/FOLIC AC 27MG-0.8MG
1 TABLET ORAL DAILY
Status: DISCONTINUED | OUTPATIENT
Start: 2025-01-27 | End: 2025-01-30 | Stop reason: HOSPADM

## 2025-01-27 RX ORDER — BUPIVACAINE HCL/0.9 % NACL/PF 0.125 %
PLASTIC BAG, INJECTION (ML) EPIDURAL AS NEEDED
Status: DISCONTINUED | OUTPATIENT
Start: 2025-01-27 | End: 2025-01-27 | Stop reason: SURG

## 2025-01-27 RX ORDER — BUPIVACAINE HYDROCHLORIDE 7.5 MG/ML
INJECTION, SOLUTION INTRASPINAL AS NEEDED
Status: DISCONTINUED | OUTPATIENT
Start: 2025-01-27 | End: 2025-01-27 | Stop reason: SURG

## 2025-01-27 RX ORDER — OXYTOCIN/0.9 % SODIUM CHLORIDE 30/500 ML
PLASTIC BAG, INJECTION (ML) INTRAVENOUS AS NEEDED
Status: DISCONTINUED | OUTPATIENT
Start: 2025-01-27 | End: 2025-01-27 | Stop reason: SURG

## 2025-01-27 RX ORDER — MORPHINE SULFATE 0.5 MG/ML
INJECTION, SOLUTION EPIDURAL; INTRATHECAL; INTRAVENOUS AS NEEDED
Status: DISCONTINUED | OUTPATIENT
Start: 2025-01-27 | End: 2025-01-27 | Stop reason: SURG

## 2025-01-27 RX ORDER — CITRIC ACID/SODIUM CITRATE 334-500MG
30 SOLUTION, ORAL ORAL ONCE
Status: COMPLETED | OUTPATIENT
Start: 2025-01-27 | End: 2025-01-27

## 2025-01-27 RX ORDER — DIPHENHYDRAMINE HYDROCHLORIDE 50 MG/ML
25 INJECTION INTRAMUSCULAR; INTRAVENOUS EVERY 4 HOURS PRN
Status: DISCONTINUED | OUTPATIENT
Start: 2025-01-27 | End: 2025-01-27

## 2025-01-27 RX ORDER — FAMOTIDINE 10 MG/ML
INJECTION, SOLUTION INTRAVENOUS AS NEEDED
Status: DISCONTINUED | OUTPATIENT
Start: 2025-01-27 | End: 2025-01-27 | Stop reason: SURG

## 2025-01-27 RX ORDER — PROCHLORPERAZINE EDISYLATE 5 MG/ML
2.5 INJECTION INTRAMUSCULAR; INTRAVENOUS EVERY 6 HOURS PRN
Status: DISPENSED | OUTPATIENT
Start: 2025-01-27 | End: 2025-01-28

## 2025-01-27 RX ORDER — OXYTOCIN/0.9 % SODIUM CHLORIDE 30/500 ML
125 PLASTIC BAG, INJECTION (ML) INTRAVENOUS ONCE AS NEEDED
Status: DISCONTINUED | OUTPATIENT
Start: 2025-01-27 | End: 2025-01-30 | Stop reason: HOSPADM

## 2025-01-27 RX ORDER — MIDAZOLAM HYDROCHLORIDE 1 MG/ML
INJECTION, SOLUTION INTRAMUSCULAR; INTRAVENOUS AS NEEDED
Status: DISCONTINUED | OUTPATIENT
Start: 2025-01-27 | End: 2025-01-27 | Stop reason: SURG

## 2025-01-27 RX ORDER — ONDANSETRON 2 MG/ML
4 INJECTION INTRAMUSCULAR; INTRAVENOUS ONCE AS NEEDED
Status: DISCONTINUED | OUTPATIENT
Start: 2025-01-27 | End: 2025-01-27

## 2025-01-27 RX ORDER — SIMETHICONE 80 MG
80 TABLET,CHEWABLE ORAL 4 TIMES DAILY PRN
Status: DISCONTINUED | OUTPATIENT
Start: 2025-01-27 | End: 2025-01-30 | Stop reason: HOSPADM

## 2025-01-27 RX ADMIN — KETOROLAC TROMETHAMINE 15 MG: 15 INJECTION, SOLUTION INTRAMUSCULAR; INTRAVENOUS at 22:59

## 2025-01-27 RX ADMIN — Medication 500 ML: at 08:05

## 2025-01-27 RX ADMIN — MORPHINE SULFATE 150 MCG: 0.5 INJECTION, SOLUTION EPIDURAL; INTRATHECAL; INTRAVENOUS at 07:47

## 2025-01-27 RX ADMIN — KETOROLAC TROMETHAMINE 15 MG: 15 INJECTION, SOLUTION INTRAMUSCULAR; INTRAVENOUS at 15:25

## 2025-01-27 RX ADMIN — SODIUM CHLORIDE, POTASSIUM CHLORIDE, SODIUM LACTATE AND CALCIUM CHLORIDE 125 ML/HR: 600; 310; 30; 20 INJECTION, SOLUTION INTRAVENOUS at 07:23

## 2025-01-27 RX ADMIN — SODIUM CHLORIDE, POTASSIUM CHLORIDE, SODIUM LACTATE AND CALCIUM CHLORIDE: 600; 310; 30; 20 INJECTION, SOLUTION INTRAVENOUS at 08:01

## 2025-01-27 RX ADMIN — SODIUM CHLORIDE 2 G: 900 INJECTION INTRAVENOUS at 07:23

## 2025-01-27 RX ADMIN — SODIUM CHLORIDE, POTASSIUM CHLORIDE, SODIUM LACTATE AND CALCIUM CHLORIDE 125 ML/HR: 600; 310; 30; 20 INJECTION, SOLUTION INTRAVENOUS at 10:22

## 2025-01-27 RX ADMIN — ACETAMINOPHEN 1000 MG: 500 TABLET ORAL at 13:20

## 2025-01-27 RX ADMIN — ACETAMINOPHEN 1000 MG: 500 TABLET ORAL at 07:00

## 2025-01-27 RX ADMIN — METHYLERGONOVINE MALEATE 200 MCG: 0.2 INJECTION, SOLUTION INTRAMUSCULAR; INTRAVENOUS at 08:09

## 2025-01-27 RX ADMIN — BUPIVACAINE HYDROCHLORIDE IN DEXTROSE 1.4 ML: 7.5 INJECTION, SOLUTION SUBARACHNOID at 07:47

## 2025-01-27 RX ADMIN — KETOROLAC TROMETHAMINE 30 MG: 30 INJECTION, SOLUTION INTRAMUSCULAR; INTRAVENOUS at 08:59

## 2025-01-27 RX ADMIN — Medication 650 ML/HR: at 10:25

## 2025-01-27 RX ADMIN — Medication 1 APPLICATION: at 22:59

## 2025-01-27 RX ADMIN — ACETAMINOPHEN 1000 MG: 500 TABLET ORAL at 18:23

## 2025-01-27 RX ADMIN — DOCUSATE SODIUM 100 MG: 100 CAPSULE, LIQUID FILLED ORAL at 22:59

## 2025-01-27 RX ADMIN — SIMETHICONE 80 MG: 80 TABLET, CHEWABLE ORAL at 22:59

## 2025-01-27 RX ADMIN — PROCHLORPERAZINE EDISYLATE 2.5 MG: 5 INJECTION INTRAMUSCULAR; INTRAVENOUS at 11:38

## 2025-01-27 RX ADMIN — FENTANYL CITRATE 20 MCG: 50 INJECTION, SOLUTION INTRAMUSCULAR; INTRAVENOUS at 07:47

## 2025-01-27 RX ADMIN — METOCLOPRAMIDE 10 MG: 5 INJECTION, SOLUTION INTRAMUSCULAR; INTRAVENOUS at 07:41

## 2025-01-27 RX ADMIN — SODIUM CHLORIDE, POTASSIUM CHLORIDE, SODIUM LACTATE AND CALCIUM CHLORIDE 1000 ML: 600; 310; 30; 20 INJECTION, SOLUTION INTRAVENOUS at 06:59

## 2025-01-27 RX ADMIN — Medication 200 MCG: at 07:52

## 2025-01-27 RX ADMIN — FAMOTIDINE 20 MG: 10 INJECTION, SOLUTION INTRAVENOUS at 07:41

## 2025-01-27 RX ADMIN — SODIUM CITRATE AND CITRIC ACID MONOHYDRATE 30 ML: 500; 334 SOLUTION ORAL at 07:23

## 2025-01-27 RX ADMIN — MIDAZOLAM HYDROCHLORIDE 1 MG: 1 INJECTION, SOLUTION INTRAMUSCULAR; INTRAVENOUS at 07:41

## 2025-01-27 NOTE — ANESTHESIA PROCEDURE NOTES
Spinal Block      Patient reassessed immediately prior to procedure    Patient location during procedure: OR  Indication:at surgeon's request  Performed By  CRNA/MARICEL: Criselda Jacobs CRNA  Preanesthetic Checklist  Completed: patient identified, IV checked, site marked, risks and benefits discussed, surgical consent, monitors and equipment checked, pre-op evaluation and timeout performed  Spinal Block Prep:  Patient Position:sitting  Sterile Tech:cap, gloves, mask and sterile barriers  Prep:Betadine  Patient Monitoring:blood pressure monitoring, continuous pulse oximetry and EKG    Spinal Block Procedure  Approach:midline  Guidance:palpation technique  Location:L3-L4  Needle Type:Suzie  Needle Gauge:25 G  Placement of Spinal needle event:cerebrospinal fluid aspirated  Paresthesia: no  Fluid Appearance:clear     Post Assessment  Patient Tolerance:patient tolerated the procedure well with no apparent complications  Complications no

## 2025-01-27 NOTE — ANESTHESIA PREPROCEDURE EVALUATION
Anesthesia Evaluation     Patient summary reviewed and Nursing notes reviewed   NPO Solid Status: > 6 hours  NPO Liquid Status: > 2 hours           Airway   Mallampati: II  TM distance: >3 FB  Neck ROM: full  Dental      Pulmonary - negative pulmonary ROS   Cardiovascular - negative cardio ROS        Neuro/Psych- negative ROS  GI/Hepatic/Renal/Endo    (+) GERD, diabetes mellitus gestational    Musculoskeletal (-) negative ROS    Abdominal    Substance History - negative use     OB/GYN    (+) Pregnant        Other - negative ROS                   Anesthesia Plan    ASA 2     ITN and spinal       Anesthetic plan, risks, benefits, and alternatives have been provided, discussed and informed consent has been obtained with: patient.    Use of blood products discussed with patient .    Plan discussed with attending.    CODE STATUS:    Level Of Support Discussed With: Patient  Code Status (Patient has no pulse and is not breathing): CPR (Attempt to Resuscitate)  Medical Interventions (Patient has pulse or is breathing): Full Support

## 2025-01-27 NOTE — H&P
History and Physical:    Subjective     Chief Complaint   Patient presents with    Scheduled        Nancy Little is a 29 y.o. year old  with an Estimated Date of Delivery: 25 currently at 39w1d presenting with  .  Prior  section desires repeat .    Prenatal care has been with Cheyanne Vasquez MD.  It has been significant for diabetes (GDM A1), previous C/S - (desires repeat ), and No Complications.      Review of Systems   Constitutional: Negative.    HENT: Negative.     Respiratory: Negative.     Cardiovascular: Negative.    Gastrointestinal: Negative.    Genitourinary: Negative.    Musculoskeletal: Negative.    Skin: Negative.    Allergic/Immunologic: Negative.    Neurological: Negative.    Hematological: Negative.    Psychiatric/Behavioral: Negative.             Past Medical History:   Diagnosis Date    Acid reflux     hx nexium    BRCA1 negative     BRCA2 negative     Eczema     Gestational diabetes     History of pre-eclampsia     Preeclampsia     Tachycardia     after first delivery     Urinary retention      Past Surgical History:   Procedure Laterality Date     SECTION N/A 3/19/2021    Procedure:  SECTION PRIMARY;  Surgeon: Cheyanne Vasquez MD;  Location:  [facility];  Service: Obstetrics/Gynecology;  Laterality: N/A;    CHEILECTOMY Right 2016    Procedure: RT HALLUX RIGIDUS REPAIR CHONDROPLASTY QIAN OSTEOTOMY ;  Surgeon: Bob Grant MD;  Location[place] [facility] [place];  Service:     WISDOM TOOTH EXTRACTION       Family History   Problem Relation Age of Onset    Leukemia Father     Breast cancer Mother     No Known Problems Sister     Diabetes Maternal Grandfather     Ovarian cancer Neg Hx     Uterine cancer Neg Hx     Colon cancer Neg Hx      Social History     Tobacco Use    Smoking status: Never     Passive exposure: Never    Smokeless tobacco: Never   Vaping Use    Vaping status: Never Used   Substance Use Topics     "Alcohol use: No    Drug use: No     Medications Prior to Admission   Medication Sig Dispense Refill Last Dose/Taking    glucose blood test strip Use as instructed 200 each 1 2025 Morning    glucose monitor monitoring kit Use as directed 1 each 0 2025 Morning    Lancets (freestyle) lancets Use 4 times per day 100 each 2 2025 Morning    pantoprazole (Protonix) 20 MG EC tablet Take 1 tablet by mouth Daily. 30 tablet 9 2025 Morning    Prenatal Vit-Fe Fumarate-FA (PRENATAL VITAMIN PO) Take  by mouth.   2025 Morning    ferrous sulfate 324 (65 Fe) MG tablet delayed-release EC tablet Take 1 tablet by mouth Daily With Breakfast.   More than a month     Allergies:  Penicillins and Zofran [ondansetron]  OB History    Para Term  AB Living   4 2 2 0 1 2   SAB IAB Ectopic Molar Multiple Live Births   1 0 0 0 0 2      # Outcome Date GA Lbr John/2nd Weight Sex Type Anes PTL Lv   4 Current            3 SAB 2024           2 Term 21 39w0d  3215 g (7 lb 1.4 oz) M CS-LTranv Spinal  MAE   1 Term 19 38w5d 04:00 / 00:29 2655 g (5 lb 13.7 oz) F Vag-Vacuum EPI N MAE             Objective     /86   Pulse 98   Temp 98.2 °F (36.8 °C) (Oral)   Resp 16   Ht 157.5 cm (62\")   Wt 60.3 kg (133 lb)   LMP 2024   SpO2 98%   Breastfeeding No   BMI 24.33 kg/m²     Physical Exam    General:  No acute distress   Lung: Clear to auscultation bilaterally, no wheezing, clear at bases   Heart: Regular rate and rhythm, no murmurs    Abdomen: Gravid, nontender   Extremities: 2+ DTR's bilaterally, no edema   FHT's: reactive    Cervix: Deferred   [place]: Contraction are none           Lab Review   External Prenatal Results       Pregnancy Outside Results - Transcribed From Office Records - See Scanned Records For Details       Test Value Date Time    ABO  A  25    Rh  Positive  25    Antibody Screen  Negative  25       Negative  25 1146       Negative  " 11/12/24 0948       Negative  07/15/24 1423    Varicella IgG       Rubella  4.70 index 07/15/24 1423    Hgb  11.8 g/dL 01/24/25 0927       12.1 g/dL 01/16/25 1038       11.3 g/dL 01/11/25 1156       10.8 g/dL 11/12/24 0948       13.1 g/dL 07/15/24 1423    Hct  35.8 % 01/24/25 0927       36.9 % 01/16/25 1038       33.5 % 01/11/25 1156       32.3 % 11/12/24 0948       39.3 % 07/15/24 1423    HgB A1c        1h GTT  145 mg/dL 11/12/24 0948    3h GTT Fasting       3h GTT 1 hour       3h GTT 2 hour       3h GTT 3 hour        Gonorrhea (discrete)  Negative  07/15/24 1423    Chlamydia (discrete)  Negative  07/15/24 1423    RPR  Non Reactive  11/12/24 0948       Non Reactive  07/15/24 1423    Syphils cascade: TP-Ab (FTA)  Non-Reactive  01/24/25 0927    TP-Ab  Non-Reactive  01/24/25 0927    TP-Ab (EIA)       TPPA       HBsAg  Negative  07/15/24 1423    Herpes Simplex Virus PCR       Herpes Simplex VIrus Culture       HIV  Non Reactive  07/15/24 1423    Hep C RNA Quant PCR       Hep C Antibody  Non Reactive  07/15/24 1423    AFP       NIPT       Cystic Fibrosis (Edith)       Cystic Fibroisis        Spinal Muscular atrophy       Fragile X       Group B Strep  No Group B Streptococcus isolated  01/03/25 1748    GBS Susceptibility to Clindamycin       GBS Susceptibility to Erythromycin       Fetal Fibronectin       Genetic Testing, Maternal Blood                 Drug Screening       Test Value Date Time    Urine Drug Screen       Amphetamine Screen       Barbiturate Screen       Benzodiazepine Screen       Methadone Screen       Phencyclidine Screen       Opiates Screen       THC Screen       Cocaine Screen       Propoxyphene Screen       Buprenorphine Screen       Methamphetamine Screen       Oxycodone Screen       Tricyclic Antidepressants Screen                 Legend    ^: Historical                                Lab Results   Component Value Date    ALKPHOS 151 (H) 01/16/2025    ALT 15 01/16/2025    AST 17 01/16/2025     CREATININE 0.75 2025    BILITOT 0.3 2025     2025    URICACID 4.2 2025     Lab Results   Component Value Date    WBC 8.13 2025    HGB 11.8 (L) 2025    HCT 35.8 2025    MCV 87.3 2025     2025        Results for orders placed in visit on 24    US Ob Follow Up Transabdominal Approach    Narrative  PAT NAME: GILES SMITH  MED REC#: [id number]  BIRTH DA: 1995  PAT GEND: F  ACCOUNT#: [id number]  PAT TYPE: O  EXAM BASILIA: <OBR.7.1>[id number]</OBR.7.1><OBR.7.1>[id number]</OBR.7.1>  REF PHYS YANET FISHER  ACCESSION [id number]      Indication  ========    GDM      Comparison Studies  =================    There are no relevant prior studies to which this study is being compared      Method  =======    [place] E6, Transabdominal ultrasound examination. View: Sufficient      Pregnancy  =========    [place] pregnancy. Number of fetuses: 1      Dating  ======    LMP on: 2024  GA by LMP 33 w + 1 d  PEDRITO by LMP: 2025  Method of dating: based on stated PEDRITO  GA by prior assessment 34 w + 5 d  PEDRITO by prior assessment: 2025  Ultrasound examination on: 2024  GA by U/S based upon: AC, BPD, Femur, HC  GA by U/S 34 w + 5 d  PEDRITO by U/S: 2025  Previous dating: based on stated PEDRITO, selected on 2024  Agreed PEDRITO of previous datin2025  Assigned: based on stated PEDRITO, selected on 2024  Assigned GA 34 w + 5 d  Assigned PEDRITO: 2025  Pregnancy length 280 d      General Evaluation  ================    Cardiac activity present.  bpm.  Fetal movements visualized.  Presentation cephalic.  Placenta Placental site: posterior.  Amniotic fluid Amount of AF: normal. MVP 4.4 cm. MIMI 15.8 cm. Q1 4.4 cm, Q2 4.0 cm, Q3 4.1 cm, Q4 3.3 cm.      Biophysical Profile  ===============    2: Fetal breathing movements  2: Gross body movements  2: Fetal tone  2: Amniotic fluid volume  8/8 Biophysical profile score      Fetal  Biometry  ============    Standard  BPD 87.2 mm  35w 1d        65%        Hadlock    .3 mm  35w 3d        31%        Hadlock    .7 mm  33w 6d        30%        Hadlock    Femur 66.4 mm  34w 1d        28%        Hadlock    HC / AC 1.06    EFW 2,380 g  36%        Olivier    EFW (lb) 5 lb  EFW (oz) 4 oz  EFW by: Hadlock (BPD-HC-AC-FL)  Extremities / Bony Struc  FL / BPD 0.76    FL / HC 0.21    FL / AC 0.22    Other Structures   bpm      Fetal Anatomy  ============    Stomach: visualized  Kidneys: visualized  Bladder: visualized  Gender: male      Impression  ==========    Male fetus in cephalic presentation fetal heart rate of 131. Posterior placenta noted. Normal fluid volume with an MIMI of 15.8. BPP 8 out of 8. Estimated fetal weight 5  pounds 4 ounces which is 36 percentile.      Recommendation  ===============    Follow-up as clinically indicated.      Indication  ========    GDM      Comparison Studies  =================    There are no relevant prior studies to which this study is being compared      Method  =======    [place] E6, Transabdominal ultrasound examination. View: Sufficient      Pregnancy  =========    [place] pregnancy. Number of fetuses: 1      Dating  ======    LMP on: 2024  GA by LMP 33 w + 1 d  PEDRITO by LMP: 2025  Method of dating: based on stated PEDRITO  GA by prior assessment 34 w + 5 d  PEDRITO by prior assessment: 2025  Ultrasound examination on: 2024  GA by U/S based upon: AC, BPD, Femur, HC  GA by U/S 34 w + 5 d  PEDRITO by U/S: 2025  Previous dating: based on stated PEDRITO, selected on 2024  Agreed PEDRITO of previous datin2025  Assigned: based on stated PEDRITO, selected on 2024  Assigned GA 34 w + 5 d  Assigned PEDRITO: 2025  Pregnancy length 280 d      General Evaluation  ================    Cardiac activity present.  bpm.  Fetal movements visualized.  Presentation cephalic.  Placenta Placental site: posterior.  Amniotic fluid Amount of AF:  normal. MVP 4.4 cm. MIMI 15.8 cm. Q1 4.4 cm, Q2 4.0 cm, Q3 4.1 cm, Q4 3.3 cm.      Biophysical Profile  ===============    2: Fetal breathing movements  2: Gross body movements  2: Fetal tone  2: Amniotic fluid volume  8/8 Biophysical profile score      Fetal Biometry  ============    Standard  BPD 87.2 mm  35w 1d        65%        Hadlock    .3 mm  35w 3d        31%        Hadlock    .7 mm  33w 6d        30%        Hadlock    Femur 66.4 mm  34w 1d        28%        Hadlock    HC / AC 1.06    EFW 2,380 g  36%        Olivier    EFW (lb) 5 lb  EFW (oz) 4 oz  EFW by: Hadlock (BPD-HC-AC-FL)  Extremities / Bony Struc  FL / BPD 0.76    FL / HC 0.21    FL / AC 0.22    Other Structures   bpm      Fetal Anatomy  ============    Stomach: visualized  Kidneys: visualized  Bladder: visualized  Gender: male      Impression  ==========    Male fetus in cephalic presentation fetal heart rate of 131. Posterior placenta noted. Normal fluid volume with an MIMI of 15.8. BPP 8 out of 8. Estimated fetal weight 5  pounds 4 ounces which is 36 percentile.      Recommendation  ===============    Follow-up as clinically indicated.        Sonographer: Hilda Hines RDMS  Physician: Cheyanne Vasquez MD, FACOG    Electronically signed by: Cheyanne Vasquez MD, FACOG at: 2025/01/16 10:31              Patient Active Problem List    Diagnosis Date Noted    Pregnancy 01/15/2025    Anemia in pregnancy 11/14/2024     Note Last Updated: 11/14/2024     Hemoglobin 10.8 at 28 weeks.  Advised iron therapy.      Diet controlled gestational diabetes mellitus (GDM), antepartum 11/14/2024     Note Last Updated: 12/27/2024     145.  Recommend 3-hour testing- pt unable to tolerate 3hr she will check BS until next visit. Parameters given will call with any elevation  12/10/2024-patient having approximately 5 levels that are outside of range.  Will send to diabetic teaching.    12/27/24- MIMI 15.8cm, AC 30%, EFW 36%.       History of pre-eclampsia  07/15/2024     Note Last Updated: 2024     G1 2019 38w5d VAVD, got magnesium  Baseline labs within normal limits.  Baseline 24-hour urine 11      Prenatal care, antepartum 07/15/2024     Note Last Updated: 7/15/2024     5/19 VAVD 38 weeks [place] 5 pounds 14 ounces.  IOL for pre-e, got mag  3/2021 C/S @ 39w 7#1oz baby boy elective      Previous  section 07/15/2024     Note Last Updated: 1/15/2025     3/2021 C/S @ 39w 7#1oz baby boy elective    Repeat scheduled on 2025 at 7:30 AM-orders in      Family history of breast cancer in mother 2022     Note Last Updated: 2023     Diagnosed at age 38.  BRCA negative.  Patient a candidate for yearly mammograms and yearly MRIs starting at age 29 with the risk of 21%.          Assessment & Plan     ASSESSMENT  IUP at 39w1d  scheduled  section  A1 diabetes   3. GBBS negative    PLAN  Admit to labor and delivery   2.   repeat  section         Cheyanne Vasquez MD  2025@

## 2025-01-27 NOTE — ANESTHESIA POSTPROCEDURE EVALUATION
Patient: Nancy Little    Procedure Summary       Date: 25 Room / Location: [facility]   [facility]    Anesthesia Start: 740 Anesthesia Stop: 839    Procedure:  SECTION REPEAT (Abdomen) Diagnosis:     Surgeons: Cheyanne Vasquez MD Provider: Lynne Gamez DO    Anesthesia Type: ITN, spinal ASA Status: 2            Anesthesia Type: ITN, spinal    Vitals  Vitals Value Taken Time   /66 25 0835   Temp 97.6 °F (36.4 °C) 25 0837   Pulse 113 25 0838   Resp 16    SpO2 99 % 25 0838   Vitals shown include unfiled device data.        Post Anesthesia Care and Evaluation    Patient location during evaluation: bedside  Patient participation: complete - patient participated  Level of consciousness: awake and alert  Pain management: adequate    Airway patency: patent  Anesthetic complications: No anesthetic complications    Cardiovascular status: acceptable  Respiratory status: acceptable  Hydration status: acceptable

## 2025-01-27 NOTE — OP NOTE
[facility]   Section Operative Note    Pre-Operative Dx:   1.  IUP at 39w1d  weeks     2. Prior         Postoperative dx:    1.  Same     Procedure: Procedure(s):   SECTION REPEAT   Surgeon: Cheyanne Vasquze MD    Assistant: Surgeon(s):  Cheyanne Vasquez MD        Anesthesia: Spinal   EBL:   mls.  630  mls.         IV Fluids: 1700 mls.   UOP: 50 mls.       Antibiotics: cefazolin (Ancef)     Infant:            Gender: male infant    Weight: 3291 g (7 lb 4.1 oz)    Apgars: 8  @ 1 minute /     9  @ 5 minutes    Fetal presentation: cephalic   Amniotic fluid: Clear      Complications:   None      Disposition:   Mother to Mother Baby/Postpartum  in stable condition currently.   Baby to NBN  in stable condition currently.       Procedure:   The patient was taken to the operating room where spinal anesthesia was placed, and she was placed in supine position with a leftward tilt. Sequential compression devices on bilateral lower extremities and a Aguilar catheter placed in her bladder. After being prepped and draped in a normal sterile fashion, a Pfannenstiel skin incision was made with a scalpel and taken down to the level of the fascia using the Bovie. The fascia was incised in the midline and extended laterally. The superior edge of the fascia was grasped with Kocher clamps, elevated and the rectus muscle dissected off. In a similar fashion, the inferior edge of the fascia was grasped with Kocher clamps, elevated and the rectus muscles dissected off. The rectus muscles were bisected in the midline. The peritoneum was identified, grasped and entered into sharply using Metzenbaum scissors. This incision was extended superiorly and inferiorly with good visualization of the bladder. Bladder blade was placed. A bladder flap was created. A low transverse uterine incision was made with a scalpel and extended laterally. Amniotomy with clear fluid occurred at the time of hysterotomy. The head  was brought to the uterine incision, and using fundal pressure, the head delivered without complication. Nose and mouth were bulb suctioned.  Shoulders and body were to follow. Cord was milked x4, clamped x2 and cut. Infant was handed to the awaiting pediatric team.  Cord blood and gases were obtained. The placenta was manually extracted. The uterus was exteriorized and cleared of all clot and debris and the hysterotomy site was closed with a 1-0 chromic in a running locked fashion starting at the left angle and moving towards the right. Copious irrigation behind the uterus ensued. The uterus was returned to the abdominal cavity. Paracolic gutters were cleared of all clot and debris. The hysterotomy site was noted to be hemostatic as well as the bladder flap and Interceed was placed over this. The peritoneum was reapproximated using a 2-0 chromic in a running fashion starting superiorly and moving inferiorly. Irrigation over the rectus muscles then occurred with Bovie cauterization of any bleeding sites. The fascia was reapproximated using a 0 Vicryl in a running fashion starting at the left angle and moving towards the right.  The subcutaneous fat layer was hemostatic and closed in an interrupted fashion with 2-0 Plain.  The skin was reapproximated with a 4-0 vicryl on a Star needle. All sponge, lap, and needle counts were correct x2. The patient was taken to the recovery room in stable condition.             Cheyanne Vasquez MD  1/27/2025  08:58 EST

## 2025-01-28 LAB
BASOPHILS # BLD AUTO: 0.04 10*3/MM3 (ref 0–0.2)
BASOPHILS NFR BLD AUTO: 0.3 % (ref 0–1.5)
DEPRECATED RDW RBC AUTO: 40.8 FL (ref 37–54)
EOSINOPHIL # BLD AUTO: 0.23 10*3/MM3 (ref 0–0.4)
EOSINOPHIL NFR BLD AUTO: 1.9 % (ref 0.3–6.2)
ERYTHROCYTE [DISTWIDTH] IN BLOOD BY AUTOMATED COUNT: 12.7 % (ref 12.3–15.4)
HCT VFR BLD AUTO: 31 % (ref 34–46.6)
HGB BLD-MCNC: 9.9 G/DL (ref 12–15.9)
IMM GRANULOCYTES # BLD AUTO: 0.1 10*3/MM3 (ref 0–0.05)
IMM GRANULOCYTES NFR BLD AUTO: 0.8 % (ref 0–0.5)
LYMPHOCYTES # BLD AUTO: 1.65 10*3/MM3 (ref 0.7–3.1)
LYMPHOCYTES NFR BLD AUTO: 13.6 % (ref 19.6–45.3)
MCH RBC QN AUTO: 28.2 PG (ref 26.6–33)
MCHC RBC AUTO-ENTMCNC: 31.9 G/DL (ref 31.5–35.7)
MCV RBC AUTO: 88.3 FL (ref 79–97)
MONOCYTES # BLD AUTO: 0.57 10*3/MM3 (ref 0.1–0.9)
MONOCYTES NFR BLD AUTO: 4.7 % (ref 5–12)
NEUTROPHILS NFR BLD AUTO: 78.7 % (ref 42.7–76)
NEUTROPHILS NFR BLD AUTO: 9.51 10*3/MM3 (ref 1.7–7)
NRBC BLD AUTO-RTO: 0 /100 WBC (ref 0–0.2)
PLATELET # BLD AUTO: 177 10*3/MM3 (ref 140–450)
PMV BLD AUTO: 12.1 FL (ref 6–12)
RBC # BLD AUTO: 3.51 10*6/MM3 (ref 3.77–5.28)
WBC NRBC COR # BLD AUTO: 12.1 10*3/MM3 (ref 3.4–10.8)

## 2025-01-28 PROCEDURE — 25010000002 KETOROLAC TROMETHAMINE PER 15 MG: Performed by: OBSTETRICS & GYNECOLOGY

## 2025-01-28 PROCEDURE — 85025 COMPLETE CBC W/AUTO DIFF WBC: CPT | Performed by: OBSTETRICS & GYNECOLOGY

## 2025-01-28 PROCEDURE — 0503F POSTPARTUM CARE VISIT: CPT

## 2025-01-28 RX ORDER — DOCUSATE SODIUM 100 MG/1
100 CAPSULE, LIQUID FILLED ORAL 2 TIMES DAILY
Status: DISCONTINUED | OUTPATIENT
Start: 2025-01-28 | End: 2025-01-30 | Stop reason: HOSPADM

## 2025-01-28 RX ORDER — FERROUS SULFATE 325(65) MG
325 TABLET ORAL
Status: DISCONTINUED | OUTPATIENT
Start: 2025-01-28 | End: 2025-01-30 | Stop reason: HOSPADM

## 2025-01-28 RX ADMIN — KETOROLAC TROMETHAMINE 15 MG: 15 INJECTION, SOLUTION INTRAMUSCULAR; INTRAVENOUS at 04:54

## 2025-01-28 RX ADMIN — ACETAMINOPHEN 1000 MG: 500 TABLET ORAL at 06:30

## 2025-01-28 RX ADMIN — DOCUSATE SODIUM 100 MG: 100 CAPSULE, LIQUID FILLED ORAL at 10:13

## 2025-01-28 RX ADMIN — ACETAMINOPHEN 650 MG: 325 TABLET ORAL at 18:50

## 2025-01-28 RX ADMIN — ACETAMINOPHEN 650 MG: 325 TABLET ORAL at 13:17

## 2025-01-28 RX ADMIN — Medication 10 ML: at 16:35

## 2025-01-28 RX ADMIN — ACETAMINOPHEN 1000 MG: 500 TABLET ORAL at 00:37

## 2025-01-28 RX ADMIN — Medication 10 ML: at 10:13

## 2025-01-28 RX ADMIN — SIMETHICONE 80 MG: 80 TABLET, CHEWABLE ORAL at 22:56

## 2025-01-28 RX ADMIN — KETOROLAC TROMETHAMINE 15 MG: 15 INJECTION, SOLUTION INTRAMUSCULAR; INTRAVENOUS at 10:13

## 2025-01-28 RX ADMIN — IBUPROFEN 600 MG: 600 TABLET, FILM COATED ORAL at 22:56

## 2025-01-28 RX ADMIN — FERROUS SULFATE TAB 325 MG (65 MG ELEMENTAL FE) 325 MG: 325 (65 FE) TAB at 10:13

## 2025-01-28 RX ADMIN — IBUPROFEN 600 MG: 600 TABLET, FILM COATED ORAL at 16:35

## 2025-01-28 RX ADMIN — PRENATAL VITAMINS-IRON FUMARATE 27 MG IRON-FOLIC ACID 0.8 MG TABLET 1 TABLET: at 08:59

## 2025-01-28 RX ADMIN — DOCUSATE SODIUM 100 MG: 100 CAPSULE, LIQUID FILLED ORAL at 22:56

## 2025-01-28 NOTE — ANESTHESIA POSTPROCEDURE EVALUATION
Patient: Nancy Little    Procedure Summary       Date: 25 Room / Location: [facility]   [facility]    Anesthesia Start: 740 Anesthesia Stop: 839    Procedure:  SECTION REPEAT (Abdomen) Diagnosis:     Surgeons: Cheyanne Vasquez MD Provider: Lynne Gamez DO    Anesthesia Type: ITN, spinal ASA Status: 2            Anesthesia Type: ITN, spinal    Vitals  Vitals Value Taken Time   /63 25 1149   Temp 98.1 °F (36.7 °C) 25 1149   Pulse 79 25 1149   Resp 16 25 1149   SpO2 86 % 25 1042   Vitals shown include unfiled device data.        Post Anesthesia Care and Evaluation    Patient location during evaluation: bedside  Patient participation: complete - patient participated  Level of consciousness: awake and alert  Pain management: adequate    Airway patency: patent  Anesthetic complications: No anesthetic complications    Cardiovascular status: acceptable  Respiratory status: acceptable  Hydration status: acceptable  Post Neuraxial Block status: Motor and sensory function returned to baseline and No signs or symptoms of PDPH

## 2025-01-28 NOTE — PROGRESS NOTES
"Progress Note    Patient name: Nancy Little  YOB: 1995   MRN: [id number]  Admission Date: 2025  Date of Service: 2025  Referring: Cheyanne Vasquez MD    ID: 29 y.o.     Post Op Day: 1 Day Post-Op    Diagnosis:   S/p  delivery    Diet controlled gestational diabetes mellitus (GDM), antepartum    Pregnancy    Delivery of pregnancy by  section       Subjective:      No complaints.  Normal lochia.  Ambulating, voiding, tolerating diet.  Pain well controlled.  The patient is currently breastfeeding.  This baby is a male. Circ w/ urology.    Objective:      Temp:  [97.7 °F (36.5 °C)-98.6 °F (37 °C)] 98 °F (36.7 °C)  Heart Rate:  [53-83] 67  Resp:  [16] 16  BP: (102-123)/(58-84) 105/60    Medications:  Scheduled Meds:acetaminophen, 650 mg, Oral, Q6H  ketorolac, 15 mg, Intravenous, Q6H   Followed by  ibuprofen, 600 mg, Oral, Q6H  prenatal vitamin, 1 tablet, Oral, Daily  sodium chloride, 1,000 mL, Intravenous, Once  sodium chloride, 10 mL, Intravenous, Q12H      Continuous Infusions:   PRN Meds:.  aluminum-magnesium hydroxide-simethicone **OR** calcium carbonate    carboprost    docusate sodium    Hydrocortisone (Perianal)    lanolin    methylergonovine    miSOPROStol    ondansetron ODT    oxyCODONE **OR** oxyCODONE    oxytocin    prochlorperazine    promethazine **OR** promethazine    simethicone    sodium chloride    sodium chloride     Exam:  Abdomen: soft, nontender  Uterus: nontender, firm  Incision: clean, dry, intact  Extremities: nontender; no edema     Labs:  Lab Results   Component Value Date    HGB 9.9 (L) 2025     No results found for: \"ABORH\"    Assessment/Plan:      POD#1 Day Post-Op s/p repeat  delivery. Doing well.   Asymptomatic anemia. VSS.   Hx of preeclampsia. Normotensive. Asymptomatic. VSS.   Diet controlled gestational diabetes. BS controlled as of 25.     Plan:  Continue routine postoperative care. Advance.   Begin PO iron " with constipation precautions.   AM CBC f/u anemia.       Sherie Cuevas, APRN  1/28/2025

## 2025-01-29 LAB
DEPRECATED RDW RBC AUTO: 41.8 FL (ref 37–54)
ERYTHROCYTE [DISTWIDTH] IN BLOOD BY AUTOMATED COUNT: 12.9 % (ref 12.3–15.4)
HCT VFR BLD AUTO: 31.2 % (ref 34–46.6)
HGB BLD-MCNC: 9.9 G/DL (ref 12–15.9)
MCH RBC QN AUTO: 28.7 PG (ref 26.6–33)
MCHC RBC AUTO-ENTMCNC: 31.7 G/DL (ref 31.5–35.7)
MCV RBC AUTO: 90.4 FL (ref 79–97)
PLATELET # BLD AUTO: 183 10*3/MM3 (ref 140–450)
PMV BLD AUTO: 11.5 FL (ref 6–12)
RBC # BLD AUTO: 3.45 10*6/MM3 (ref 3.77–5.28)
WBC NRBC COR # BLD AUTO: 11.37 10*3/MM3 (ref 3.4–10.8)

## 2025-01-29 PROCEDURE — 85027 COMPLETE CBC AUTOMATED: CPT

## 2025-01-29 PROCEDURE — 0503F POSTPARTUM CARE VISIT: CPT | Performed by: NURSE PRACTITIONER

## 2025-01-29 RX ADMIN — DOCUSATE SODIUM 100 MG: 100 CAPSULE, LIQUID FILLED ORAL at 08:31

## 2025-01-29 RX ADMIN — IBUPROFEN 600 MG: 600 TABLET, FILM COATED ORAL at 09:29

## 2025-01-29 RX ADMIN — ACETAMINOPHEN 650 MG: 325 TABLET ORAL at 00:31

## 2025-01-29 RX ADMIN — SIMETHICONE 80 MG: 80 TABLET, CHEWABLE ORAL at 20:13

## 2025-01-29 RX ADMIN — IBUPROFEN 600 MG: 600 TABLET, FILM COATED ORAL at 15:26

## 2025-01-29 RX ADMIN — IBUPROFEN 600 MG: 600 TABLET, FILM COATED ORAL at 22:02

## 2025-01-29 RX ADMIN — ACETAMINOPHEN 650 MG: 325 TABLET ORAL at 18:06

## 2025-01-29 RX ADMIN — FERROUS SULFATE TAB 325 MG (65 MG ELEMENTAL FE) 325 MG: 325 (65 FE) TAB at 08:31

## 2025-01-29 RX ADMIN — PRENATAL VITAMINS-IRON FUMARATE 27 MG IRON-FOLIC ACID 0.8 MG TABLET 1 TABLET: at 08:31

## 2025-01-29 RX ADMIN — SIMETHICONE 80 MG: 80 TABLET, CHEWABLE ORAL at 08:31

## 2025-01-29 RX ADMIN — ACETAMINOPHEN 650 MG: 325 TABLET ORAL at 06:07

## 2025-01-29 RX ADMIN — DOCUSATE SODIUM 100 MG: 100 CAPSULE, LIQUID FILLED ORAL at 20:13

## 2025-01-29 RX ADMIN — ACETAMINOPHEN 650 MG: 325 TABLET ORAL at 12:03

## 2025-01-29 RX ADMIN — IBUPROFEN 600 MG: 600 TABLET, FILM COATED ORAL at 03:54

## 2025-01-29 NOTE — PROGRESS NOTES
Postpartum Progress Note    Patient name: Nancy Little  YOB: 1995   MRN: [id number]  Referring Provider: Cheyanne Vasquez MD  Admission Date: 2025  Date of Service: 2025    ID: 29 y.o.     Diagnosis:   S/p  delivery 2 Days Post-Op     Diet controlled gestational diabetes mellitus (GDM), antepartum    Pregnancy    Delivery of pregnancy by  section       Subjective:      No complaints.  Moderate lochia.  Ambulating, voiding, tolerating diet.  Pain well controlled.  The patient is currently breastfeeding.   This baby is a male    Objective:      Vital signs:  Vital Signs Range for the last 24 hours  Temperature: Temp:  [98.1 °F (36.7 °C)-98.5 °F (36.9 °C)] 98.2 °F (36.8 °C)   Temp Source: Temp src: Oral   BP: BP: (102-113)/(63-73) 107/73   Pulse: Heart Rate:  [71-87] 81   Respirations: Resp:  [16-18] 18   Weight: 60.3 kg (133 lb)     General: Alert & oriented x4, in no apparent distress  Abdomen: soft, nontender  Uterus: firm, nontender  Incision: clean, dry, intact  Extremities: nontender; no edema      Labs:  Lab Results   Component Value Date    WBC 11.37 (H) 2025    HGB 9.9 (L) 2025    HCT 31.2 (L) 2025    MCV 90.4 2025     2025     Results from last 7 days   Lab Units 25  0927   ABO TYPING  A   RH TYPING  Positive     External Prenatal Results       Pregnancy Outside Results - Transcribed From Office Records - See Scanned Records For Details       Test Value Date Time    ABO  A  25 0927    Rh  Positive  25 0927    Antibody Screen  Negative  25 0927       Negative  25 1146       Negative  24 0948       Negative  07/15/24 1423    Varicella IgG       Rubella  4.70 index 07/15/24 1423    Hgb  9.9 g/dL 25 0840       9.9 g/dL 25 0840       11.8 g/dL 25 0927       12.1 g/dL 25 1038       11.3 g/dL 25 1156       10.8 g/dL 24 0948       13.1 g/dL  07/15/24 1423    Hct  31.2 % 25 0840       31.0 % 25 0840       35.8 % 25 0927       36.9 % 25 1038       33.5 % 25 1156       32.3 % 24 0948       39.3 % 07/15/24 1423    HgB A1c        1h GTT  145 mg/dL 24 0948    3h GTT Fasting       3h GTT 1 hour       3h GTT 2 hour       3h GTT 3 hour        Gonorrhea (discrete)  Negative  07/15/24 1423    Chlamydia (discrete)  Negative  07/15/24 1423    RPR  Non Reactive  24 0948       Non Reactive  07/15/24 1423    Syphils cascade: TP-Ab (FTA)  Non-Reactive  25    TP-Ab  Non-Reactive  25    TP-Ab (EIA)       TPPA       HBsAg  Negative  07/15/24 1423    Herpes Simplex Virus PCR       Herpes Simplex VIrus Culture       HIV  Non Reactive  07/15/24 1423    Hep C RNA Quant PCR       Hep C Antibody  Non Reactive  07/15/24 1423    AFP       NIPT       Cystic Fibrosis (Edith)       Cystic Fibroisis        Spinal Muscular atrophy       Fragile X       Group B Strep  No Group B Streptococcus isolated  25 1748    GBS Susceptibility to Clindamycin       GBS Susceptibility to Erythromycin       Fetal Fibronectin       Genetic Testing, Maternal Blood                 Drug Screening       Test Value Date Time    Urine Drug Screen       Amphetamine Screen       Barbiturate Screen       Benzodiazepine Screen       Methadone Screen       Phencyclidine Screen       Opiates Screen       THC Screen       Cocaine Screen       Propoxyphene Screen       Buprenorphine Screen       Methamphetamine Screen       Oxycodone Screen       Tricyclic Antidepressants Screen                 Legend    ^: Historical                            Assessment/Plan:      2 Days Post-Op s/p Procedure(s):   SECTION REPEAT  1. S/p  delivery: Continue postoperative care.  Doing well.  2. Infant feeding: Supportive care.  The patient is currently breastfeeding. Male infant desires circumcision.  3. Diet controlled GDM. No longer  checking FSBS.

## 2025-01-30 VITALS
SYSTOLIC BLOOD PRESSURE: 123 MMHG | WEIGHT: 133 LBS | BODY MASS INDEX: 24.48 KG/M2 | TEMPERATURE: 98.2 F | RESPIRATION RATE: 20 BRPM | OXYGEN SATURATION: 99 % | HEART RATE: 77 BPM | DIASTOLIC BLOOD PRESSURE: 85 MMHG | HEIGHT: 62 IN

## 2025-01-30 PROBLEM — Z34.90 PREGNANCY: Status: RESOLVED | Noted: 2025-01-15 | Resolved: 2025-01-30

## 2025-01-30 RX ADMIN — IBUPROFEN 600 MG: 600 TABLET, FILM COATED ORAL at 04:00

## 2025-01-30 RX ADMIN — PRENATAL VITAMINS-IRON FUMARATE 27 MG IRON-FOLIC ACID 0.8 MG TABLET 1 TABLET: at 09:36

## 2025-01-30 RX ADMIN — ACETAMINOPHEN 650 MG: 325 TABLET ORAL at 06:28

## 2025-01-30 RX ADMIN — DOCUSATE SODIUM 100 MG: 100 CAPSULE, LIQUID FILLED ORAL at 09:36

## 2025-01-30 RX ADMIN — ACETAMINOPHEN 650 MG: 325 TABLET ORAL at 00:05

## 2025-01-30 RX ADMIN — FERROUS SULFATE TAB 325 MG (65 MG ELEMENTAL FE) 325 MG: 325 (65 FE) TAB at 09:37

## 2025-01-30 RX ADMIN — IBUPROFEN 600 MG: 600 TABLET, FILM COATED ORAL at 09:36

## 2025-01-30 NOTE — PLAN OF CARE
Goal Outcome Evaluation:         VSS. Pain controlled. Continuing to breastfeed well.

## 2025-01-30 NOTE — DISCHARGE SUMMARY
Discharge Summary    Date of Admission: 2025  Date of Discharge:  2025      Patient: Nancy Little      MR#:[id number]    Primary Surgeon/OB: Cheyanne Vasquez MD    Discharge Surgeon/OB: /Chuck BARBER    Presenting Problem/History of Present Illness  Pregnancy [Z34.90]       Diet controlled gestational diabetes mellitus (GDM), antepartum    Delivery of pregnancy by  section        Discharge Diagnosis:  section at 39w1d    Procedures:  , Low Transverse    2025   8:02 AM     Feeding method: Breastfeeding Status: Yes    Rh Immune globulin given: no    Rubella vaccine given: no    Discharge Date: 2025; Discharge Time: 10:27 EST    Early Discharge:  NO    Hospital Course  Patient is a 29 y.o. female  at 39w1d status post  section with uneventful postoperative recovery.  Patient was advanced to regular diet on postoperative day#1.  On discharge, ambulating, tolerating a regular diet without any difficulties and her incision is dry, clean and intact.     Infant:   male fetus 3291 g (7 lb 4.1 oz) with Apgar scores of 8 , 9  at five minutes.    Circumcision: no-needs outpatient urology    Condition on Discharge:  Stable    Vital Signs  Temp:  [98.2 °F (36.8 °C)] 98.2 °F (36.8 °C)  Heart Rate:  [73-84] 77  Resp:  [16-20] 20  BP: (108-123)/(59-85) 123/85    Lab Results   Component Value Date    WBC 11.37 (H) 2025    HGB 9.9 (L) 2025    HCT 31.2 (L) 2025    MCV 90.4 2025     2025       Discharge Disposition  Home or Self Care    Discharge Medications     Discharge Medications        Continue These Medications        Instructions Start Date   ferrous sulfate 324 (65 Fe) MG tablet delayed-release EC tablet   324 mg, Oral, Daily With Breakfast      freestyle lancets   Use 4 times per day      glucose monitor monitoring kit   Use as directed      pantoprazole 20 MG EC tablet  Commonly known as: Protonix   20 mg,  Oral, Daily      PRENATAL VITAMIN PO   Take  by mouth.             Stop These Medications      glucose blood test strip              Discharge Diet:   Diet Instructions       Diet: Regular/House Diet; Thin (IDDSI 0)      Discharge Diet: Regular/House Diet    Fluid Consistency: Thin (IDDSI 0)            Activity at Discharge:   Activity Instructions       Driving Restrictions      Type of Restriction:  Driving  Lifting  Bathing  Sex       Driving Restrictions: No Driving (Time Limited)    Length: 2 Weeks    Explain Sexual Activity Restrictions: 6 weeks    Bathing Restrictions: No Tub Bath    Lifting Restrictions: Lifting Restriction (Indicate Limit)    Weight Limit (Pounds): 20    Length of Lifting Restriction: 2 weeks    Pelvic Rest              Follow-up Appointments  Future Appointments   Date Time Provider Department Center   2/11/2025 11:30 AM Jana Woods APRN [facility]  [facility]     Additional Instructions for the Follow-ups that You Need to Schedule       Call MD With Problems / Concerns   As directed      Instructions: Monitor for excessive bleeding >1 pad/hr for several hours, dizziness, syncope, shortness of breath, chest pain, fever or changes in blood pressure. Call or go to ED with foul smelling discharge, fever of >100.4, signs of postpartum depression, saturating a pad in <1 hour, blood clots larger than a golf ball. Also, call with headache that does not resolve with medication or rest, vision changes, pain in right upper quadrant, worsening swelling, or BP >140/90 if able to monitor at home.    Order Comments: Instructions: Monitor for excessive bleeding >1 pad/hr for several hours, dizziness, syncope, shortness of breath, chest pain, fever or changes in blood pressure. Call or go to ED with foul smelling discharge, fever of >100.4, signs of postpartum depression, saturating a pad in <1 hour, blood clots larger than a golf ball. Also, call with headache that does not resolve with medication or  rest, vision changes, pain in right upper quadrant, worsening swelling, or BP >140/90 if able to monitor at home.         Discharge Follow-up with Specified Provider: SUNIL; 2 Weeks   As directed      To: SUNIL   Follow Up: 2 Weeks                SUNIL Quick  01/30/25  10:26 EST

## 2025-01-30 NOTE — PLAN OF CARE
Goal Outcome Evaluation:         VSS, lochia/incision/fundus WDL, pain well controlled, home meds reviewed, discharge teaching complete and discharging today.

## 2025-01-31 ENCOUNTER — MATERNAL SCREENING (OUTPATIENT)
Dept: CALL CENTER | Facility: HOSPITAL | Age: 30
End: 2025-01-31
Payer: COMMERCIAL

## 2025-01-31 NOTE — OUTREACH NOTE
Maternal Screening Survey      Flowsheet Row Responses   Eligibility Eligible   Prep survey completed? Yes   Facility patient discharged from? [facility] - Registered Nurse

## 2025-02-09 ENCOUNTER — MATERNAL SCREENING (OUTPATIENT)
Dept: CALL CENTER | Facility: HOSPITAL | Age: 30
End: 2025-02-09
Payer: COMMERCIAL

## 2025-02-09 NOTE — OUTREACH NOTE
Maternal Screening Survey      Flowsheet Row Responses   Facility patient discharged from? [place]   Attempt successful? Yes   Call start time    Call end time    I have been able to laugh and see the funny side of things. 0   I have looked forward with enjoyment to things. 0   I have blamed myself unnecessarily when things went wrong. 0   I have been anxious or worried for no good reason. 0   I have felt scared or panicky for no good reason. 0   Things have been getting on top of me. 0   I have been so unhappy that I have had difficulty sleeping. 0   I have felt sad or miserable. 0   I have been so unhappy that I have been crying. 0   The thought of harming myself has occurred to me. 0   [place]  Depression Scale Total 0   Did any of your parents have problems with alcohol or drug use? No   Do any of your peers have problems with alcohol or drug use? No   Does your partner have problems with alcohol or drug use? No   Before you were pregnant did you have problems with alcohol or drug use? (past) No   In the past month, did you drink beer, wine, liquor or use any other drugs? (pregnancy) No   Maternal Screening call completed Yes   Call end time               [facility] - Registered Nurse

## 2025-02-11 ENCOUNTER — POSTPARTUM VISIT (OUTPATIENT)
Dept: OBSTETRICS AND GYNECOLOGY | Facility: CLINIC | Age: 30
End: 2025-02-11
Payer: COMMERCIAL

## 2025-02-11 VITALS
BODY MASS INDEX: 21.16 KG/M2 | SYSTOLIC BLOOD PRESSURE: 102 MMHG | WEIGHT: 115 LBS | DIASTOLIC BLOOD PRESSURE: 60 MMHG | HEIGHT: 62 IN

## 2025-02-11 DIAGNOSIS — Z48.89 ENCOUNTER FOR POST SURGICAL WOUND CHECK: ICD-10-CM

## 2025-02-11 NOTE — PROGRESS NOTES
Chief Complaint   Patient presents with    Postpartum Care       Postpartum Visit         Nancy Little is a 29 y.o.  who presents today for a 2 week(s) postpartum check.      C/S: repeat     , Low Transverse   Information for the patient's :  Steven Little [[id number]]   2025   male   Steven Little   3291 g (7 lb 4.1 oz)   Gestational Age: 39w1d     Baby Discharged with Mom  Delivering MD: Cheyanne Vasquez MD.    Pregnancy complications: gestational DM     Patient reports her incision is clean, dry, intact. Patient describes vaginal bleeding as light. She is breastfeeding. She would like to discuss the following complaints today: denies issues.    Her  is scheduled vasectomy on 2025 for contraception.    Patient denies concerns for postpartum depression/anxiety. Patient denies  suicidal or homicidal ideation. Her postpartum depression screening questionnaire: score=0. No treatment is indicated      The additional following portions of the patient's history were reviewed and updated as appropriate: allergies, current medications, past medical history, past social history, past surgical history, and problem list.      Review of Systems   Respiratory: Negative.  Negative for shortness of breath.    Cardiovascular: Negative.  Negative for chest pain.   Gastrointestinal: Negative.  Negative for abdominal distention, abdominal pain and constipation.   Genitourinary:  Positive for vaginal bleeding. Negative for breast discharge, breast lump, breast pain, dysuria, pelvic pain, pelvic pressure, urinary incontinence, vaginal discharge and vaginal pain.   Skin:  Positive for wound.        Nml incision tenderness   Psychiatric/Behavioral: Negative.  Negative for depressed mood. The patient is not nervous/anxious.        I have reviewed and agree with the HPI, ROS, and historical information as entered above. Sherie Cuevas, APRN      Objective   /60    "Ht 157.5 cm (62\")   Wt 52.2 kg (115 lb)   LMP 2024   Breastfeeding Yes   BMI 21.03 kg/m²     Physical Exam  Vitals and nursing note reviewed.   Constitutional:       General: She is not in acute distress.     Appearance: Normal appearance. She is not ill-appearing or toxic-appearing.   HENT:      Head: Normocephalic and atraumatic.   Pulmonary:      Effort: Pulmonary effort is normal.   Abdominal:      Palpations: Abdomen is soft. There is no mass.      Tenderness: There is no abdominal tenderness (Nml incision tenderness).      Hernia: No hernia is present.          Comments: LTCS incision well approximated; no drainage, bleeding, or redness.    Neurological:      Mental Status: She is alert and oriented to person, place, and time.   Psychiatric:         Mood and Affect: Mood normal.         Behavior: Behavior normal.              Assessment and Plan    Problem List Items Addressed This Visit    None  Visit Diagnoses       Postpartum care following  delivery    -  Primary    Encounter for post surgical wound check                S/p , 2 week(s) postpartum.  Doing well.    Continued pelvic rest with a return to driving and light physical activity.  Baby doing well.  Breastfeeding going well.  No si/sx of postpartum depression  Contraception: contraceptive methods: Abstinence. Vasectomy.   Care after c/s education included in patient instructions.   Return in about 4 weeks (around 3/11/2025) for 6 week PP visit, Hiren Cuevas, APRN  2025  "

## 2025-03-11 ENCOUNTER — POSTPARTUM VISIT (OUTPATIENT)
Dept: OBSTETRICS AND GYNECOLOGY | Facility: CLINIC | Age: 30
End: 2025-03-11
Payer: COMMERCIAL

## 2025-03-11 VITALS
WEIGHT: 111 LBS | BODY MASS INDEX: 20.43 KG/M2 | HEIGHT: 62 IN | SYSTOLIC BLOOD PRESSURE: 100 MMHG | DIASTOLIC BLOOD PRESSURE: 66 MMHG

## 2025-03-11 DIAGNOSIS — Z80.3 FAMILY HISTORY OF BREAST CANCER IN MOTHER: ICD-10-CM

## 2025-03-11 PROBLEM — O24.410 DIET CONTROLLED GESTATIONAL DIABETES MELLITUS (GDM), ANTEPARTUM: Status: RESOLVED | Noted: 2024-11-14 | Resolved: 2025-03-11

## 2025-03-11 PROBLEM — Z34.90 PRENATAL CARE, ANTEPARTUM: Status: RESOLVED | Noted: 2024-07-15 | Resolved: 2025-03-11

## 2025-03-11 PROBLEM — O99.019 ANEMIA IN PREGNANCY: Status: RESOLVED | Noted: 2024-11-14 | Resolved: 2025-03-11

## 2025-03-11 NOTE — PROGRESS NOTES
Chief Complaint   Patient presents with    Postpartum Care       Postpartum Visit         Nancy Little is a 30 y.o.  who presents today for a 6 week(s) postpartum check.     C/S: repeat     , Low Transverse   Information for the patient's :  Steven Little [[id number]]   2025   male   Steven Little   3291 g (7 lb 4.1 oz)   Gestational Age: 39w1d       Baby Discharged: Discharged with Mom  Delivering Physician: Cheyanne Vasquez MD    Her pregnancy was complicated by gestational DM diet controlled. The incision is healing well. Patient describes vaginal bleeding as light.  Patient is breastfeeding.  She desires  vasectomy  for contraception.  has already had a vasectomy and will use condoms until his follow up.      She would like to discuss the following complaints today: none.    Patient denies concerns for postpartum depression/anxiety. Patient denies  suicidal or homicidal ideation. Her postpartum depression screening questionnaire: 0. No treatment is indicated      Last Pap : 22. Results: negative. HPV: negative.   Last Completed Pap Smear            Awaiting Completion       PAP SMEAR (Every 3 Years) Order placed this encounter      2025  Order placed for LIQUID-BASED PAP SMEAR WITH HPV GENOTYPING REGARDLESS OF INTERPRETATION (TONY,FIDENCIO,NIR) by Cheyanne Vasquez MD    2022  LIQUID-BASED PAP SMEAR, [facility] ([facility])    2020  Done - negative                              The additional following portions of the patient's history were reviewed and updated as appropriate: allergies, current medications, past family history, past medical history, past social history, past surgical history, and problem list.    Review of Systems   Constitutional: Negative.    HENT: Negative.     Eyes: Negative.    Respiratory: Negative.     Cardiovascular: Negative.    Gastrointestinal: Negative.    Endocrine: Negative.    Genitourinary:  "Negative.    Musculoskeletal: Negative.    Skin: Negative.    Allergic/Immunologic: Negative.    Neurological: Negative.    Hematological: Negative.    Psychiatric/Behavioral: Negative.       All other systems reviewed and are negative.     I have reviewed and agree with the HPI, ROS, and historical information as entered above. Cheyanne Vasquez MD      /66   Ht 157.5 cm (62\")   Wt 50.3 kg (111 lb)   LMP 2024   Breastfeeding Yes   BMI 20.30 kg/m²     Physical Exam  Vitals and nursing note reviewed. Exam conducted with a chaperone present.   Constitutional:       Appearance: She is well-developed.   HENT:      Head: Normocephalic and atraumatic.   Pulmonary:      Effort: Pulmonary effort is normal.   Abdominal:      General: A surgical scar is present.      Palpations: Abdomen is soft. Abdomen is not rigid.      Comments: Clean, Dry, and Intact.  No erythema.    Genitourinary:     Vagina: No lesions or prolapsed vaginal walls.      Cervix: No lesion or erythema.      Uterus: Enlarged. Not tender and no uterine prolapse.       Adnexa:         Right: No mass, tenderness or fullness.          Left: No mass, tenderness or fullness.     Musculoskeletal:      Cervical back: Normal range of motion.   Neurological:      Mental Status: She is alert and oriented to person, place, and time.   Psychiatric:         Mood and Affect: Mood normal.         Behavior: Behavior normal.             Assessment and Plan    Problem List Items Addressed This Visit          Family History    Family history of breast cancer in mother    Overview   Diagnosed at age 38.  BRCA negative.  Patient a candidate for yearly mammograms and yearly MRIs starting at age 29 with the risk of 21%.          Other Visit Diagnoses         Postpartum follow-up    -  Primary    Relevant Orders    LIQUID-BASED PAP SMEAR WITH HPV GENOTYPING REGARDLESS OF INTERPRETATION (TONY,COR,MAD)            S/p , 6 week(s) postpartum.  Doing well.  "   Return to normal physical activity.  No pelvic restrictions.   Baby doing well.  Breastfeeding going well.  Patient with family history of breast cancer in her mother at age 39.  Patient due for mammogram and breast MRI however she is breast-feeding.  She will reach out when she stopped breast-feeding so we can order screening at that time.  Contraception: contraceptive methods: Vasectomy   Return in about 1 year (around 3/11/2026) for Annual physical.     Cheyanne Vasquez MD  03/11/2025

## 2025-03-12 LAB — REF LAB TEST METHOD: NORMAL

## 2025-04-04 DIAGNOSIS — K21.9 GASTROESOPHAGEAL REFLUX IN PREGNANCY: ICD-10-CM

## 2025-04-04 DIAGNOSIS — O99.619 GASTROESOPHAGEAL REFLUX IN PREGNANCY: ICD-10-CM

## 2025-04-04 RX ORDER — PANTOPRAZOLE SODIUM 20 MG/1
20 TABLET, DELAYED RELEASE ORAL DAILY
Qty: 90 TABLET | Refills: 3 | OUTPATIENT
Start: 2025-04-04

## 2025-08-09 DIAGNOSIS — N61.0 ACUTE MASTITIS: Primary | ICD-10-CM

## 2025-08-09 RX ORDER — CEPHALEXIN 500 MG/1
500 CAPSULE ORAL 4 TIMES DAILY
Qty: 40 CAPSULE | Refills: 0 | Status: SHIPPED | OUTPATIENT
Start: 2025-08-09 | End: 2025-08-19

## (undated) DEVICE — SKIN AFFIX SURG ADHESIVE 72/CS 0.55ML: Brand: MEDLINE

## (undated) DEVICE — GLV SURG BIOGEL LTX PF 6 1/2

## (undated) DEVICE — SOL IRR H2O BTL 1000ML STRL

## (undated) DEVICE — TRAXI PANNICULUS RETRACTOR WITH RETENTUS TECHNOLOGY (BMI 30-50): Brand: TRAXI® PANNICULUS RETRACTOR

## (undated) DEVICE — PATIENT RETURN ELECTRODE, SINGLE-USE, CONTACT QUALITY MONITORING, ADULT, WITH 9FT CORD, FOR PATIENTS WEIGING OVER 33LBS. (15KG): Brand: MEGADYNE

## (undated) DEVICE — SOL IRR NACL 0.9PCT BT 1000ML

## (undated) DEVICE — PK C/SECT 10

## (undated) DEVICE — TRY SPINE BLCK WHITACRE 25G 3X5IN

## (undated) DEVICE — SUT PDS 1 TP1 48IN Z880G BX/12

## (undated) DEVICE — SUT GUT CHRM 2/0 CT1 27IN 811H

## (undated) DEVICE — CLTH CLENS READYCLEANSE PERI CARE PK/5

## (undated) DEVICE — APPL CHLORAPREP TINTED 26ML TEAL

## (undated) DEVICE — PENCL SMOKE/EVAC MEGADYNE TELESCP 10FT

## (undated) DEVICE — SUT VIC 4/0 KS 27IN VCP662H

## (undated) DEVICE — ADHS SKIN PREMIERPRO EXOFIN TOPICAL HI/VISC .5ML

## (undated) DEVICE — SUT GUT CHRM 1 CTX 36IN 905H

## (undated) DEVICE — SOL IRR NACL 0.9PCT BO 1000ML

## (undated) DEVICE — MAT PREVALON MOBL TRANSFR AIR WO/PAD 39X80IN

## (undated) DEVICE — TRAP FLD MINIVAC MEGADYNE 100ML

## (undated) DEVICE — 4-PORT MANIFOLD: Brand: NEPTUNE 2

## (undated) DEVICE — COATED VICRYL  (POLYGLACTIN 910) SUTURE, VIOLET BRAIDED, STERILE, SYNTHETIC ABSORBABLE SUTURE: Brand: COATED VICRYL

## (undated) DEVICE — SOL IRR H2O BO 1000ML STRL